# Patient Record
Sex: MALE | Race: WHITE | NOT HISPANIC OR LATINO
[De-identification: names, ages, dates, MRNs, and addresses within clinical notes are randomized per-mention and may not be internally consistent; named-entity substitution may affect disease eponyms.]

---

## 2017-02-16 ENCOUNTER — APPOINTMENT (OUTPATIENT)
Dept: POPULATION HEALTH | Facility: CLINIC | Age: 42
End: 2017-02-16

## 2017-02-16 VITALS
DIASTOLIC BLOOD PRESSURE: 78 MMHG | HEART RATE: 102 BPM | HEIGHT: 66 IN | BODY MASS INDEX: 40.66 KG/M2 | RESPIRATION RATE: 15 BRPM | SYSTOLIC BLOOD PRESSURE: 150 MMHG | WEIGHT: 253 LBS

## 2017-02-16 VITALS — OXYGEN SATURATION: 97 %

## 2017-03-27 ENCOUNTER — RX RENEWAL (OUTPATIENT)
Age: 42
End: 2017-03-27

## 2017-03-30 ENCOUNTER — RX RENEWAL (OUTPATIENT)
Age: 42
End: 2017-03-30

## 2017-06-29 ENCOUNTER — APPOINTMENT (OUTPATIENT)
Dept: POPULATION HEALTH | Facility: CLINIC | Age: 42
End: 2017-06-29
Payer: OTHER MISCELLANEOUS

## 2017-06-29 VITALS
DIASTOLIC BLOOD PRESSURE: 93 MMHG | RESPIRATION RATE: 17 BRPM | BODY MASS INDEX: 40.5 KG/M2 | TEMPERATURE: 98.6 F | SYSTOLIC BLOOD PRESSURE: 142 MMHG | WEIGHT: 252 LBS | HEART RATE: 105 BPM | OXYGEN SATURATION: 96 % | HEIGHT: 66 IN

## 2017-06-29 DIAGNOSIS — R10.33 PERIUMBILICAL PAIN: ICD-10-CM

## 2017-06-29 PROCEDURE — 99214 OFFICE O/P EST MOD 30 MIN: CPT

## 2017-08-24 ENCOUNTER — APPOINTMENT (OUTPATIENT)
Dept: POPULATION HEALTH | Facility: CLINIC | Age: 42
End: 2017-08-24

## 2019-03-04 ENCOUNTER — APPOINTMENT (OUTPATIENT)
Dept: POPULATION HEALTH | Facility: CLINIC | Age: 44
End: 2019-03-04
Payer: OTHER MISCELLANEOUS

## 2019-03-04 ENCOUNTER — TRANSCRIPTION ENCOUNTER (OUTPATIENT)
Age: 44
End: 2019-03-04

## 2019-03-04 VITALS
HEIGHT: 66 IN | RESPIRATION RATE: 17 BRPM | WEIGHT: 256 LBS | SYSTOLIC BLOOD PRESSURE: 132 MMHG | DIASTOLIC BLOOD PRESSURE: 91 MMHG | HEART RATE: 86 BPM | TEMPERATURE: 98.3 F | OXYGEN SATURATION: 95 % | BODY MASS INDEX: 41.14 KG/M2

## 2019-03-04 PROCEDURE — 94010 BREATHING CAPACITY TEST: CPT

## 2019-03-04 PROCEDURE — 99214 OFFICE O/P EST MOD 30 MIN: CPT | Mod: 25

## 2019-03-04 NOTE — HISTORY OF PRESENT ILLNESS
[FreeTextEntry1] : 44 yo soy with ZARA, GERD and chronic LBP here for routine f/u of  work-related chronic rhinosinusitis and  RADS both of which developed from work performed on the  [address]. He has been in [place] for an extended period tending to his ailing mother. While there his upper airway sxs persisted by this lower airway sxs improved. He reports ongoing use of his nasal sprays and though they confer some benefit they cause nasal dryness, occasional nose bleeds,etc. He reports a persistent dry cough over the past 18 m but o/w respiratory sxs have worsened since he return to the [facility] area but he needs to rx for his inhalers as the prior rx is where he was not adherent with his medications and his upper and lower resp sxs worsened.He continues to have asthma and sinus-like sxs triggered by exertion, cold air, fumes, dust, and other irritants. His sinus sxs--PND, congestion also persist but sxs are improved with the steroid spray, Astelin, and Zyrtec. His GERD is controlled by a PPI. Periumbilical pain associated with and worsened by his chronic cough and needs eval for potential hernia. Is seeking some kind of oral device as a means of addressing his ZARA.\par \par Is currently OOW since last spring due to L2-S1 herniations from WRI following fall and continues to have significant limitations due to his respiratory sxs.

## 2019-03-04 NOTE — PAST MEDICAL HISTORY
[FreeTextEntry1] : See intake note\par \par [place] x 2004\par  since 1995.\par Never had respiratory issues.

## 2019-03-04 NOTE — PHYSICAL EXAM
[General Appearance - Alert] : alert [General Appearance - In No Acute Distress] : in no acute distress [Sclera] : the sclera and conjunctiva were normal [PERRL With Normal Accommodation] : pupils were equal in size, round, and reactive to light [Extraocular Movements] : extraocular movements were intact [Outer Ear] : the ears and nose were normal in appearance [Oropharynx] : the oropharynx was normal [Neck Appearance] : the appearance of the neck was normal [Neck Cervical Mass (___cm)] : no neck mass was observed [Jugular Venous Distention Increased] : there was no jugular-venous distention [Thyroid Diffuse Enlargement] : the thyroid was not enlarged [Thyroid Nodule] : there were no palpable thyroid nodules [Auscultation Breath Sounds / Voice Sounds] : lungs were clear to auscultation bilaterally [Heart Rate And Rhythm] : heart rate was normal and rhythm regular [Heart Sounds] : normal S1 and S2 [Heart Sounds Gallop] : no gallops [Murmurs] : no murmurs [Heart Sounds Pericardial Friction Rub] : no pericardial rub [Cervical Lymph Nodes Enlarged Posterior Bilaterally] : posterior cervical [Cervical Lymph Nodes Enlarged Anterior Bilaterally] : anterior cervical [Supraclavicular Lymph Nodes Enlarged Bilaterally] : supraclavicular [Axillary Lymph Nodes Enlarged Bilaterally] : axillary [Femoral Lymph Nodes Enlarged Bilaterally] : femoral [Inguinal Lymph Nodes Enlarged Bilaterally] : inguinal [Skin Color & Pigmentation] : normal skin color and pigmentation [Skin Turgor] : normal skin turgor [] : no rash [Deep Tendon Reflexes (DTR)] : deep tendon reflexes were 2+ and symmetric [Sensation] : the sensory exam was normal to light touch and pinprick [No Focal Deficits] : no focal deficits [FreeTextEntry1] : scant clear d/c; swollen turbinates r>l ; oropharynx crowded with scant cobblestoning

## 2019-03-13 RX ORDER — FLUTICASONE PROPIONATE 50 UG/1
50 SPRAY, METERED NASAL TWICE DAILY
Qty: 1 | Refills: 3 | Status: DISCONTINUED | OUTPATIENT
Start: 2017-03-27 | End: 2019-03-13

## 2019-03-13 RX ORDER — CETIRIZINE HYDROCHLORIDE 10 MG/1
10 TABLET, FILM COATED ORAL DAILY
Qty: 30 | Refills: 3 | Status: DISCONTINUED | OUTPATIENT
Start: 2017-03-27 | End: 2019-03-13

## 2019-03-13 RX ORDER — ALBUTEROL SULFATE 90 UG/1
108 (90 BASE) AEROSOL, METERED RESPIRATORY (INHALATION)
Qty: 1 | Refills: 3 | Status: DISCONTINUED | OUTPATIENT
Start: 2017-03-30 | End: 2019-03-13

## 2019-03-13 RX ORDER — AZELASTINE HYDROCHLORIDE 205.5 UG/1
0.15 SPRAY, METERED NASAL
Qty: 1 | Refills: 3 | Status: DISCONTINUED | COMMUNITY
Start: 2017-03-30 | End: 2019-03-13

## 2019-03-14 ENCOUNTER — TRANSCRIPTION ENCOUNTER (OUTPATIENT)
Age: 44
End: 2019-03-14

## 2019-05-09 ENCOUNTER — APPOINTMENT (OUTPATIENT)
Dept: POPULATION HEALTH | Facility: CLINIC | Age: 44
End: 2019-05-09
Payer: OTHER MISCELLANEOUS

## 2019-05-09 VITALS
RESPIRATION RATE: 16 BRPM | SYSTOLIC BLOOD PRESSURE: 140 MMHG | BODY MASS INDEX: 50.65 KG/M2 | WEIGHT: 258 LBS | HEIGHT: 60 IN | TEMPERATURE: 98.1 F | DIASTOLIC BLOOD PRESSURE: 100 MMHG | OXYGEN SATURATION: 95 % | HEART RATE: 89 BPM

## 2019-05-09 DIAGNOSIS — Z28.21 IMMUNIZATION NOT CARRIED OUT BECAUSE OF PATIENT REFUSAL: ICD-10-CM

## 2019-05-09 PROCEDURE — 99214 OFFICE O/P EST MOD 30 MIN: CPT

## 2019-05-15 ENCOUNTER — APPOINTMENT (OUTPATIENT)
Dept: SURGERY | Facility: CLINIC | Age: 44
End: 2019-05-15

## 2019-05-17 ENCOUNTER — APPOINTMENT (OUTPATIENT)
Dept: SURGERY | Facility: CLINIC | Age: 44
End: 2019-05-17

## 2019-07-08 ENCOUNTER — APPOINTMENT (OUTPATIENT)
Dept: POPULATION HEALTH | Facility: CLINIC | Age: 44
End: 2019-07-08
Payer: OTHER MISCELLANEOUS

## 2019-07-08 VITALS
TEMPERATURE: 97.9 F | WEIGHT: 254 LBS | RESPIRATION RATE: 14 BRPM | DIASTOLIC BLOOD PRESSURE: 84 MMHG | BODY MASS INDEX: 40.82 KG/M2 | HEART RATE: 82 BPM | OXYGEN SATURATION: 97 % | HEIGHT: 66 IN | SYSTOLIC BLOOD PRESSURE: 145 MMHG

## 2019-07-08 PROCEDURE — 99214 OFFICE O/P EST MOD 30 MIN: CPT

## 2019-07-08 NOTE — HISTORY OF PRESENT ILLNESS
[FreeTextEntry1] : 42 yo olivier with ZARA, GERD and chronic LBP here for routine f/u of  work-related chronic rhinosinusitis and  RADS both of which developed from work performed on the  [address]. He has relocated from  [place] several months ago. Since being back, particularly with the recent wave of hot weather, his sx severity has necessitated ongoing use of his nasal sprays which improve the severity of his sxs. He continues to have asthma and sinus-like sxs triggered by exertion, both hot and cold weather, fumes, dust, and other irritants. Associated with his respiratory sxs is a persistent, dry cough which is now associated with pain in his R chest wall, axilla area. His sinus sxs--PND, congestion also persist but sxs are improved with the steroid spray, Astelin, and Zyrtec. ventral, periumbilical protrusion associated with and worsened by his chronic cough and needs eval for potential hernia. Is seeking some kind of oral device as a means of addressing his AZRA.\par \par Since the last visit he saw pulm who documented asthma as a diagnosis and sent him for MCT which is pending and chest CT was unchanged from prior. He also went to ENT who documented CRS but no CT of the sinuses was done.\par Vental hernia was evaluated by surgery who recommended wt loss and repair.l\par \par Continues to be OOW since last spring due to L2-S1 herniations from [place]RI following fall and continues to have significant limitations due to his respiratory sxs all of which are likely contributing to limitations of his physical activity, wt gain.

## 2019-07-08 NOTE — PHYSICAL EXAM
[General Appearance - Alert] : alert [Sclera] : the sclera and conjunctiva were normal [PERRL With Normal Accommodation] : pupils were equal in size, round, and reactive to light [General Appearance - In No Acute Distress] : in no acute distress [Outer Ear] : the ears and nose were normal in appearance [Extraocular Movements] : extraocular movements were intact [Oropharynx] : the oropharynx was normal [Neck Appearance] : the appearance of the neck was normal [Thyroid Diffuse Enlargement] : the thyroid was not enlarged [Neck Cervical Mass (___cm)] : no neck mass was observed [Jugular Venous Distention Increased] : there was no jugular-venous distention [Auscultation Breath Sounds / Voice Sounds] : lungs were clear to auscultation bilaterally [Thyroid Nodule] : there were no palpable thyroid nodules [Heart Rate And Rhythm] : heart rate was normal and rhythm regular [Heart Sounds Gallop] : no gallops [Heart Sounds] : normal S1 and S2 [Heart Sounds Pericardial Friction Rub] : no pericardial rub [Murmurs] : no murmurs [Cervical Lymph Nodes Enlarged Anterior Bilaterally] : anterior cervical [Cervical Lymph Nodes Enlarged Posterior Bilaterally] : posterior cervical [Inguinal Lymph Nodes Enlarged Bilaterally] : inguinal [Supraclavicular Lymph Nodes Enlarged Bilaterally] : supraclavicular [Femoral Lymph Nodes Enlarged Bilaterally] : femoral [Axillary Lymph Nodes Enlarged Bilaterally] : axillary [] : no rash [Skin Turgor] : normal skin turgor [Skin Color & Pigmentation] : normal skin color and pigmentation [Sensation] : the sensory exam was normal to light touch and pinprick [Deep Tendon Reflexes (DTR)] : deep tendon reflexes were 2+ and symmetric [No Focal Deficits] : no focal deficits [FreeTextEntry1] : TTP R axilla

## 2019-07-08 NOTE — ASSESSMENT
[FreeTextEntry1] : 44 yo soy with ZARA, GERD and chronic LBP here for f/u of  work-related chronic rhinosinusitis and work-related RADS as well as resultant  worsening of underlying ZARA, obesity, chronic cough, ventral hernia. His sxs persist despite being removed from the [address] [place] [address] worksite indicating his conditions have developed into chronic problems. He now reports worsening periumbical discomfort and protrusion associated with his persistent cough. Advised ibuprofen 400 mg, po tid with meals x 3 weeks to address rib pain associated with cough. MCT is pending, I will put in for C4 auth to seek CT sinuses.\par \par F/u with ENT, Pulm and surg as advised.\par \par Given that it has been almost 5 years since exposure, MMI may have been reached.\par \par Am advising him to have his ventral hernia evaluated surgically. Obesity is a risk factor for a ventral hernia but so is chronic coughing ,due to the resultant intraabdominal pressure. Both his obesity and his cough can be linked to his work-related injuries/illnesses. Likewise, the obesity and chronic sinus issues are causing his ZARA. \par \par \par His LBP and his respiratory (upper and lower) are all contributing to his being unable to work.\par Counseled on wt loss and exercise.\par \par I counseled him on appropriate use of his inhalers, and LSM. \par \par In regards to upper and lower airway conditions, Impairment 100% at [address] [place] [address] subway, 75% away from work.\par RTC 2 m\par Will fill out C4.2 and auth for f/u CT scan

## 2019-08-05 ENCOUNTER — TRANSCRIPTION ENCOUNTER (OUTPATIENT)
Age: 44
End: 2019-08-05

## 2019-08-06 ENCOUNTER — INPATIENT (INPATIENT)
Facility: HOSPITAL | Age: 44
LOS: 0 days | Discharge: HOME | DRG: 354 | End: 2019-08-07
Attending: SURGERY | Admitting: SURGERY
Payer: COMMERCIAL

## 2019-08-06 ENCOUNTER — RESULT REVIEW (OUTPATIENT)
Age: 44
End: 2019-08-06

## 2019-08-06 VITALS
HEART RATE: 88 BPM | SYSTOLIC BLOOD PRESSURE: 122 MMHG | TEMPERATURE: 98 F | RESPIRATION RATE: 22 BRPM | OXYGEN SATURATION: 98 % | DIASTOLIC BLOOD PRESSURE: 88 MMHG

## 2019-08-06 VITALS
SYSTOLIC BLOOD PRESSURE: 139 MMHG | OXYGEN SATURATION: 96 % | HEART RATE: 96 BPM | TEMPERATURE: 98 F | DIASTOLIC BLOOD PRESSURE: 86 MMHG | RESPIRATION RATE: 20 BRPM

## 2019-08-06 DIAGNOSIS — K42.0 UMBILICAL HERNIA WITH OBSTRUCTION, WITHOUT GANGRENE: ICD-10-CM

## 2019-08-06 DIAGNOSIS — M25.562 PAIN IN LEFT KNEE: Chronic | ICD-10-CM

## 2019-08-06 LAB
ANION GAP SERPL CALC-SCNC: 13 MMOL/L — SIGNIFICANT CHANGE UP (ref 5–17)
APTT BLD: 30.3 SEC — SIGNIFICANT CHANGE UP (ref 27.5–36.3)
BASOPHILS # BLD AUTO: 0.05 K/UL — SIGNIFICANT CHANGE UP (ref 0–0.2)
BASOPHILS NFR BLD AUTO: 0.8 % — SIGNIFICANT CHANGE UP (ref 0–2)
BLD GP AB SCN SERPL QL: SIGNIFICANT CHANGE UP
BUN SERPL-MCNC: 15 MG/DL — SIGNIFICANT CHANGE UP (ref 8–20)
CALCIUM SERPL-MCNC: 9.5 MG/DL — SIGNIFICANT CHANGE UP (ref 8.6–10.2)
CHLORIDE SERPL-SCNC: 102 MMOL/L — SIGNIFICANT CHANGE UP (ref 98–107)
CO2 SERPL-SCNC: 21 MMOL/L — LOW (ref 22–29)
CREAT SERPL-MCNC: 0.88 MG/DL — SIGNIFICANT CHANGE UP (ref 0.5–1.3)
EOSINOPHIL # BLD AUTO: 0.03 K/UL — SIGNIFICANT CHANGE UP (ref 0–0.5)
EOSINOPHIL NFR BLD AUTO: 0.5 % — SIGNIFICANT CHANGE UP (ref 0–6)
GLUCOSE SERPL-MCNC: 102 MG/DL — SIGNIFICANT CHANGE UP (ref 70–115)
HCT VFR BLD CALC: 49.7 % — SIGNIFICANT CHANGE UP (ref 39–50)
HGB BLD-MCNC: 16.7 G/DL — SIGNIFICANT CHANGE UP (ref 13–17)
IMM GRANULOCYTES NFR BLD AUTO: 0.5 % — SIGNIFICANT CHANGE UP (ref 0–1.5)
INR BLD: 1.08 RATIO — SIGNIFICANT CHANGE UP (ref 0.88–1.16)
LYMPHOCYTES # BLD AUTO: 1.47 K/UL — SIGNIFICANT CHANGE UP (ref 1–3.3)
LYMPHOCYTES # BLD AUTO: 23 % — SIGNIFICANT CHANGE UP (ref 13–44)
MCHC RBC-ENTMCNC: 27.4 PG — SIGNIFICANT CHANGE UP (ref 27–34)
MCHC RBC-ENTMCNC: 33.6 GM/DL — SIGNIFICANT CHANGE UP (ref 32–36)
MCV RBC AUTO: 81.6 FL — SIGNIFICANT CHANGE UP (ref 80–100)
MONOCYTES # BLD AUTO: 0.77 K/UL — SIGNIFICANT CHANGE UP (ref 0–0.9)
MONOCYTES NFR BLD AUTO: 12.1 % — SIGNIFICANT CHANGE UP (ref 2–14)
NEUTROPHILS # BLD AUTO: 4.04 K/UL — SIGNIFICANT CHANGE UP (ref 1.8–7.4)
NEUTROPHILS NFR BLD AUTO: 63.1 % — SIGNIFICANT CHANGE UP (ref 43–77)
PLATELET # BLD AUTO: 226 K/UL — SIGNIFICANT CHANGE UP (ref 150–400)
POTASSIUM SERPL-MCNC: 4.2 MMOL/L — SIGNIFICANT CHANGE UP (ref 3.5–5.3)
POTASSIUM SERPL-SCNC: 4.2 MMOL/L — SIGNIFICANT CHANGE UP (ref 3.5–5.3)
PROTHROM AB SERPL-ACNC: 12.4 SEC — SIGNIFICANT CHANGE UP (ref 10–12.9)
RBC # BLD: 6.09 M/UL — HIGH (ref 4.2–5.8)
RBC # FLD: 13 % — SIGNIFICANT CHANGE UP (ref 10.3–14.5)
SODIUM SERPL-SCNC: 136 MMOL/L — SIGNIFICANT CHANGE UP (ref 135–145)
WBC # BLD: 6.39 K/UL — SIGNIFICANT CHANGE UP (ref 3.8–10.5)
WBC # FLD AUTO: 6.39 K/UL — SIGNIFICANT CHANGE UP (ref 3.8–10.5)

## 2019-08-06 PROCEDURE — 86901 BLOOD TYPING SEROLOGIC RH(D): CPT

## 2019-08-06 PROCEDURE — 86900 BLOOD TYPING SEROLOGIC ABO: CPT

## 2019-08-06 PROCEDURE — 85027 COMPLETE CBC AUTOMATED: CPT

## 2019-08-06 PROCEDURE — 80048 BASIC METABOLIC PNL TOTAL CA: CPT

## 2019-08-06 PROCEDURE — 93005 ELECTROCARDIOGRAM TRACING: CPT

## 2019-08-06 PROCEDURE — 88302 TISSUE EXAM BY PATHOLOGIST: CPT | Mod: 26

## 2019-08-06 PROCEDURE — 85730 THROMBOPLASTIN TIME PARTIAL: CPT

## 2019-08-06 PROCEDURE — 71046 X-RAY EXAM CHEST 2 VIEWS: CPT

## 2019-08-06 PROCEDURE — 74176 CT ABD & PELVIS W/O CONTRAST: CPT | Mod: 26

## 2019-08-06 PROCEDURE — 93010 ELECTROCARDIOGRAM REPORT: CPT

## 2019-08-06 PROCEDURE — 88302 TISSUE EXAM BY PATHOLOGIST: CPT

## 2019-08-06 PROCEDURE — 86850 RBC ANTIBODY SCREEN: CPT

## 2019-08-06 PROCEDURE — 99285 EMERGENCY DEPT VISIT HI MDM: CPT

## 2019-08-06 PROCEDURE — 36415 COLL VENOUS BLD VENIPUNCTURE: CPT

## 2019-08-06 PROCEDURE — 71046 X-RAY EXAM CHEST 2 VIEWS: CPT | Mod: 26

## 2019-08-06 PROCEDURE — 85610 PROTHROMBIN TIME: CPT

## 2019-08-06 PROCEDURE — 74176 CT ABD & PELVIS W/O CONTRAST: CPT

## 2019-08-06 RX ORDER — BUDESONIDE AND FORMOTEROL FUMARATE DIHYDRATE 160; 4.5 UG/1; UG/1
2 AEROSOL RESPIRATORY (INHALATION)
Refills: 0 | Status: DISCONTINUED | OUTPATIENT
Start: 2019-08-06 | End: 2019-08-06

## 2019-08-06 RX ORDER — OXYCODONE HYDROCHLORIDE 5 MG/1
1 TABLET ORAL
Qty: 10 | Refills: 0
Start: 2019-08-06

## 2019-08-06 RX ORDER — SODIUM CHLORIDE 9 MG/ML
1000 INJECTION, SOLUTION INTRAVENOUS
Refills: 0 | Status: DISCONTINUED | OUTPATIENT
Start: 2019-08-06 | End: 2019-08-06

## 2019-08-06 RX ORDER — LEVOTHYROXINE SODIUM 125 MCG
25 TABLET ORAL DAILY
Refills: 0 | Status: DISCONTINUED | OUTPATIENT
Start: 2019-08-06 | End: 2019-08-06

## 2019-08-06 RX ORDER — AZELASTINE 137 UG/1
2 SPRAY, METERED NASAL
Qty: 0 | Refills: 0 | DISCHARGE

## 2019-08-06 RX ORDER — ONDANSETRON 8 MG/1
4 TABLET, FILM COATED ORAL ONCE
Refills: 0 | Status: COMPLETED | OUTPATIENT
Start: 2019-08-06 | End: 2019-08-06

## 2019-08-06 RX ORDER — LORATADINE 10 MG/1
10 TABLET ORAL DAILY
Refills: 0 | Status: DISCONTINUED | OUTPATIENT
Start: 2019-08-06 | End: 2019-08-06

## 2019-08-06 RX ORDER — ALBUTEROL 90 UG/1
2 AEROSOL, METERED ORAL
Qty: 0 | Refills: 0 | DISCHARGE

## 2019-08-06 RX ORDER — MELOXICAM 15 MG/1
1 TABLET ORAL
Qty: 0 | Refills: 0 | DISCHARGE

## 2019-08-06 RX ORDER — IPRATROPIUM/ALBUTEROL SULFATE 18-103MCG
3 AEROSOL WITH ADAPTER (GRAM) INHALATION EVERY 6 HOURS
Refills: 0 | Status: DISCONTINUED | OUTPATIENT
Start: 2019-08-06 | End: 2019-08-06

## 2019-08-06 RX ORDER — FENTANYL CITRATE 50 UG/ML
50 INJECTION INTRAVENOUS
Refills: 0 | Status: DISCONTINUED | OUTPATIENT
Start: 2019-08-06 | End: 2019-08-07

## 2019-08-06 RX ORDER — LEVOTHYROXINE SODIUM 125 MCG
1 TABLET ORAL
Qty: 0 | Refills: 0 | DISCHARGE

## 2019-08-06 RX ORDER — OXYCODONE AND ACETAMINOPHEN 5; 325 MG/1; MG/1
2 TABLET ORAL EVERY 6 HOURS
Refills: 0 | Status: DISCONTINUED | OUTPATIENT
Start: 2019-08-06 | End: 2019-08-06

## 2019-08-06 RX ORDER — DIPHENHYDRAMINE HCL 50 MG
25 CAPSULE ORAL ONCE
Refills: 0 | Status: DISCONTINUED | OUTPATIENT
Start: 2019-08-06 | End: 2019-08-06

## 2019-08-06 RX ORDER — SODIUM CHLORIDE 9 MG/ML
1000 INJECTION, SOLUTION INTRAVENOUS
Refills: 0 | Status: DISCONTINUED | OUTPATIENT
Start: 2019-08-06 | End: 2019-08-07

## 2019-08-06 RX ORDER — FEXOFENADINE HCL 30 MG
1 TABLET ORAL
Qty: 0 | Refills: 0 | DISCHARGE

## 2019-08-06 RX ORDER — FLUTICASONE PROPIONATE 220 MCG
1 AEROSOL WITH ADAPTER (GRAM) INHALATION
Qty: 0 | Refills: 0 | DISCHARGE

## 2019-08-06 RX ORDER — OXYCODONE AND ACETAMINOPHEN 5; 325 MG/1; MG/1
1 TABLET ORAL EVERY 6 HOURS
Refills: 0 | Status: DISCONTINUED | OUTPATIENT
Start: 2019-08-06 | End: 2019-08-06

## 2019-08-06 RX ADMIN — SODIUM CHLORIDE 100 MILLILITER(S): 9 INJECTION, SOLUTION INTRAVENOUS at 22:51

## 2019-08-06 RX ADMIN — FENTANYL CITRATE 50 MICROGRAM(S): 50 INJECTION INTRAVENOUS at 23:19

## 2019-08-06 RX ADMIN — SODIUM CHLORIDE 100 MILLILITER(S): 9 INJECTION, SOLUTION INTRAVENOUS at 16:19

## 2019-08-06 RX ADMIN — ONDANSETRON 4 MILLIGRAM(S): 8 TABLET, FILM COATED ORAL at 23:21

## 2019-08-06 RX ADMIN — FENTANYL CITRATE 50 MICROGRAM(S): 50 INJECTION INTRAVENOUS at 23:25

## 2019-08-06 NOTE — H&P ADULT - NSHPLABSRESULTS_GEN_ALL_CORE
I&O's Detail      LABS:                        16.7   6.39  )-----------( 226      ( 06 Aug 2019 16:16 )             49.7     08-06    136  |  102  |  15.0  ----------------------------<  102  4.2   |  21.0<L>  |  0.88    Ca    9.5      06 Aug 2019 16:16      PT/INR - ( 06 Aug 2019 16:16 )   PT: 12.4 sec;   INR: 1.08 ratio         PTT - ( 06 Aug 2019 16:16 )  PTT:30.3 sec

## 2019-08-06 NOTE — H&P ADULT - NSHPREVIEWOFSYSTEMS_GEN_ALL_CORE
ROS:  HEENT: Denies headache, blurry vision  RESPIRATORY: Denies cough  CARDIAC: Denies chest pain, racing heart  GASTROINTESTINAL: Denies, constipation, diarrhea  GENITOURINARY: Denies dysuria, hematuria  NEUROLOGIC: Denies headaches, dizziness  MUSCULOSKELETAL: Denies muscle weakness  VASCULAR: Denies claudication and cramping.  ENDOCRINOLOGY: Denies heat or cold intolerance  HEMATOLOGY: Denies easy bleeding or blood transfusion.  DERMATOLOGY: Denies changes in moles or pigmentation changes  PSYCHIATRY: Denies depression, agitation or anxiety ROS:  HEENT: Denies headache, blurry vision  RESPIRATORY: Denies cough  CARDIAC: Denies chest pain, racing heart  GASTROINTESTINAL: See above  GENITOURINARY: Denies dysuria, hematuria  NEUROLOGIC: Denies headaches, dizziness  MUSCULOSKELETAL: Denies muscle weakness  VASCULAR: Denies claudication and cramping.  ENDOCRINOLOGY: Denies heat or cold intolerance  HEMATOLOGY: Denies easy bleeding or blood transfusion.  DERMATOLOGY: Denies changes in moles or pigmentation changes  PSYCHIATRY: Denies depression, agitation or anxiety

## 2019-08-06 NOTE — H&P ADULT - NSHPSOCIALHISTORY_GEN_ALL_CORE
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

## 2019-08-06 NOTE — H&P ADULT - NSICDXPASTMEDICALHX_GEN_ALL_CORE_FT
PAST MEDICAL HISTORY:  Asthma in adult Related to occupational exposure,  hx    COPD, moderate Related to occupational exposure,  hx    Hypothyroidism     Obesity     Spinal stenosis with chronic back pain related to distant injury/fall

## 2019-08-06 NOTE — H&P ADULT - NSHPPHYSICALEXAM_GEN_ALL_CORE
Vital Signs Last 24 Hrs  T(C): 36.8 (06 Aug 2019 14:07), Max: 36.8 (06 Aug 2019 14:07)  T(F): 98.3 (06 Aug 2019 14:07), Max: 98.3 (06 Aug 2019 14:07)  HR: 96 (06 Aug 2019 14:07) (96 - 96)  BP: 139/86 (06 Aug 2019 14:07) (139/86 - 139/86)  BP(mean): --  RR: 20 (06 Aug 2019 14:07) (20 - 20)  SpO2: 96% (06 Aug 2019 14:07) (96% - 96%)    PE  Gen: NAD AAO3  Pulm: No wheezing, no dyspnea  CV: RRR  Abd: Soft, obese. 5cm umbilical bulge with [id number] patch of blanching erythema over skin and localized tenderness, which is irreducible. No generalized tenderness/rebound/guarding. no inguinal hernias bilaterally  Ext: WWP,  Vasc: Pulses 2+ x4 ext  Neuro: CN II-XII intact, nonfocal exam

## 2019-08-06 NOTE — ED PROVIDER NOTE - PMH
No pertinent past medical history <<----- Click to add NO pertinent Past Medical History Mild asthma, unspecified whether complicated, unspecified whether persistent

## 2019-08-06 NOTE — ED PROVIDER NOTE - CLINICAL SUMMARY MEDICAL DECISION MAKING FREE TEXT BOX
umbilical hernia, now incarcerated/. + pain. seen by Dr Finkelstein sent to ER  PE as documented.  plan labs iv fluids npo ct ua ekg cxr admit to Dr Holder

## 2019-08-06 NOTE — H&P ADULT - ASSESSMENT
A/P: 44 yo M with Asthma/COPD and obesity with fat-containing incarcerated umbilical hernia with signs of strangulation (increased pain irreducibility, and blanching erythema overlying skin changes)    -Preop labs  -EKG  -CXR  -TNS  -CT scan shows only fat containing hernia; no bowel despite GI symptoms which were possibly related to pain, no clinical bowel obstruction which is congruent w scan  -DVT ppx SCDS and Lovenox  -Pt going to OR with Dr. Underwood today; booked as emergent addon  -Regular bed while OR is pending  -Will avoid Levaquin/Fluoroquinolones due to allergy. Pt states penicillins have never been a problem for him  -Restarted home meds and converted albuterol MDI to Duonebs standing

## 2019-08-06 NOTE — ASU DISCHARGE PLAN (ADULT/PEDIATRIC) - CALL YOUR DOCTOR IF YOU HAVE ANY OF THE FOLLOWING:
Fever greater than (need to indicate Fahrenheit or Celsius)/Bleeding that does not stop/Swelling that gets worse/Wound/Surgical Site with redness, or foul smelling discharge or pus/Pain not relieved by Medications/Nausea and vomiting that does not stop

## 2019-08-06 NOTE — H&P ADULT - HISTORY OF PRESENT ILLNESS
42 yo M w chronic cough due to COPD/Asthma has 3 years of a reducible umbilical hernia. Last week became more painful and still reducible; had been following with Dr. Finkelstein for an elective repair.  However yesterday  evening he felt increased pain severe, with irreducibility and some skin changes (erythema, blanching), which did not improve with ibuprofen and ice packs.  Pt was directed to come in today by Dr. Finkelstein due to these concerning symptoms and signs.    PMH: Obesity, prediabetes, HTN controlled with diet and exercise, Asthma/COPD 2/2 occupational exposure () 42 yo M w chronic cough due to COPD/Asthma has 3 years of a reducible umbilical hernia. Last week became more painful and still reducible; had been following with Dr. Finkelstein for an elective repair.  However yesterday  evening he felt increased pain severe, with irreducibility and some skin changes (erythema, blanching), which did not improve with ibuprofen and ice packs.  Pt was directed to come in today by Dr. Finkelstein due to these concerning symptoms and signs.  Pt had episode of nausea and diarrhea yesterday which resolved. 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 Pt is asymptomatic at this time from lung standpoint    PMH: Obesity, hypothyroid, allergic rhinitis, prediabetes, HTN controlled with diet and exercise, Asthma/COPD 2/2 occupational exposure ()

## 2019-08-06 NOTE — ED ADULT NURSE NOTE - OBJECTIVE STATEMENT
43 yom presents to ed c/o umbillical hernia protruding outward causing increased discomfort. sent here for sx denies fever denies chills denies burning urination rick.

## 2019-08-06 NOTE — ED PROVIDER NOTE - CHPI ED SYMPTOMS NEG
no nausea/no fever/no diarrhea/no vomiting no vomiting/no fever/no chills/no burning urination/no diarrhea/no nausea/no blood in stool/no dysuria/no hematuria

## 2019-08-06 NOTE — ED PROVIDER NOTE - OBJECTIVE STATEMENT
42 y/o M pt with PMHx of asthma, COPD presents to the ED c/o abdominal pain. Pt is to be admitted for incarcerated umbilical hernia. Pt had a constant cough, that pushed the hernia out. Recently pt laid down on his stomach and the hernia went back in. Now the hernia is protruding and very painful. Pt will be admitted to Dr. Holder. Pt had a clear liquid 1.5 hours ago. This morning pt had loose stool. Normal urination. Denies n/v/d, fever.   Allergic to Levaquin. Pt takes Symbicort daily.

## 2019-08-12 LAB — SURGICAL PATHOLOGY STUDY: SIGNIFICANT CHANGE UP

## 2019-09-05 ENCOUNTER — RESULT REVIEW (OUTPATIENT)
Age: 44
End: 2019-09-05

## 2019-09-12 ENCOUNTER — RX RENEWAL (OUTPATIENT)
Age: 44
End: 2019-09-12

## 2019-10-17 ENCOUNTER — APPOINTMENT (OUTPATIENT)
Dept: POPULATION HEALTH | Facility: CLINIC | Age: 44
End: 2019-10-17
Payer: OTHER MISCELLANEOUS

## 2019-10-17 VITALS
OXYGEN SATURATION: 100 % | HEIGHT: 78 IN | SYSTOLIC BLOOD PRESSURE: 146 MMHG | WEIGHT: 256 LBS | DIASTOLIC BLOOD PRESSURE: 97 MMHG | BODY MASS INDEX: 29.62 KG/M2 | RESPIRATION RATE: 16 BRPM | HEART RATE: 90 BPM | TEMPERATURE: 97.4 F

## 2019-10-17 PROBLEM — E03.9 HYPOTHYROIDISM, UNSPECIFIED: Chronic | Status: ACTIVE | Noted: 2019-08-06

## 2019-10-17 PROBLEM — M48.00 SPINAL STENOSIS, SITE UNSPECIFIED: Chronic | Status: ACTIVE | Noted: 2019-08-06

## 2019-10-17 PROBLEM — J44.9 CHRONIC OBSTRUCTIVE PULMONARY DISEASE, UNSPECIFIED: Chronic | Status: ACTIVE | Noted: 2019-08-06

## 2019-10-17 PROBLEM — E66.9 OBESITY, UNSPECIFIED: Chronic | Status: ACTIVE | Noted: 2019-08-06

## 2019-10-17 PROBLEM — J45.909 UNSPECIFIED ASTHMA, UNCOMPLICATED: Chronic | Status: ACTIVE | Noted: 2019-08-06

## 2019-10-17 PROCEDURE — 99214 OFFICE O/P EST MOD 30 MIN: CPT

## 2019-10-17 RX ORDER — CETIRIZINE HYDROCHLORIDE 10 MG/1
10 TABLET, FILM COATED ORAL DAILY
Qty: 30 | Refills: 3 | Status: DISCONTINUED | COMMUNITY
Start: 2019-03-13 | End: 2019-10-17

## 2019-10-30 ENCOUNTER — APPOINTMENT (OUTPATIENT)
Dept: FAMILY MEDICINE | Facility: CLINIC | Age: 44
End: 2019-10-30
Payer: SELF-PAY

## 2019-10-30 VITALS — WEIGHT: 26 LBS | BODY MASS INDEX: 4.2 KG/M2 | TEMPERATURE: 97.7 F | OXYGEN SATURATION: 99 % | HEART RATE: 96 BPM

## 2019-10-30 VITALS — SYSTOLIC BLOOD PRESSURE: 135 MMHG | HEART RATE: 72 BPM | DIASTOLIC BLOOD PRESSURE: 85 MMHG

## 2019-10-30 VITALS — HEART RATE: 72 BPM | SYSTOLIC BLOOD PRESSURE: 130 MMHG | DIASTOLIC BLOOD PRESSURE: 85 MMHG

## 2019-10-30 DIAGNOSIS — Z02.4 ENCOUNTER FOR EXAMINATION FOR DRIVING LICENSE: ICD-10-CM

## 2019-10-30 PROCEDURE — 99499P DOT PHYSICAL: CUSTOM

## 2019-11-27 PROBLEM — Z28.21 INFLUENZA VACCINATION DECLINED: Status: RESOLVED | Noted: 2019-05-09 | Resolved: 2019-11-27

## 2020-01-13 ENCOUNTER — APPOINTMENT (OUTPATIENT)
Dept: POPULATION HEALTH | Facility: CLINIC | Age: 45
End: 2020-01-13
Payer: OTHER MISCELLANEOUS

## 2020-01-13 VITALS
DIASTOLIC BLOOD PRESSURE: 92 MMHG | TEMPERATURE: 98.1 F | OXYGEN SATURATION: 97 % | HEIGHT: 66 IN | SYSTOLIC BLOOD PRESSURE: 128 MMHG | HEART RATE: 91 BPM | WEIGHT: 258 LBS | RESPIRATION RATE: 15 BRPM | BODY MASS INDEX: 41.46 KG/M2

## 2020-01-13 PROCEDURE — 99214 OFFICE O/P EST MOD 30 MIN: CPT

## 2020-01-13 NOTE — PHYSICAL EXAM
[General Appearance - In No Acute Distress] : in no acute distress [General Appearance - Alert] : alert [Sclera] : the sclera and conjunctiva were normal [PERRL With Normal Accommodation] : pupils were equal in size, round, and reactive to light [Extraocular Movements] : extraocular movements were intact [Outer Ear] : the ears and nose were normal in appearance [Oropharynx] : the oropharynx was normal [Neck Appearance] : the appearance of the neck was normal [Neck Cervical Mass (___cm)] : no neck mass was observed [Thyroid Diffuse Enlargement] : the thyroid was not enlarged [Jugular Venous Distention Increased] : there was no jugular-venous distention [Thyroid Nodule] : there were no palpable thyroid nodules [Auscultation Breath Sounds / Voice Sounds] : lungs were clear to auscultation bilaterally [Heart Sounds] : normal S1 and S2 [Heart Rate And Rhythm] : heart rate was normal and rhythm regular [Heart Sounds Gallop] : no gallops [Murmurs] : no murmurs [Heart Sounds Pericardial Friction Rub] : no pericardial rub [Abdomen Tenderness] : non-tender [Supraclavicular Lymph Nodes Enlarged Bilaterally] : supraclavicular [Axillary Lymph Nodes Enlarged Bilaterally] : axillary [Cervical Lymph Nodes Enlarged Anterior Bilaterally] : anterior cervical [Cervical Lymph Nodes Enlarged Posterior Bilaterally] : posterior cervical [Femoral Lymph Nodes Enlarged Bilaterally] : femoral [Inguinal Lymph Nodes Enlarged Bilaterally] : inguinal [Skin Color & Pigmentation] : normal skin color and pigmentation [] : no rash [Skin Turgor] : normal skin turgor [Deep Tendon Reflexes (DTR)] : deep tendon reflexes were 2+ and symmetric [FreeTextEntry1] : TTP R axilla [Sensation] : the sensory exam was normal to light touch and pinprick [No Focal Deficits] : no focal deficits

## 2020-01-13 NOTE — ASSESSMENT
[FreeTextEntry1] : 43 yo olivier with ZARA, GERD and chronic LBP here for f/u of  work-related chronic rhinosinusitis and work-related RADS as well as resultant  worsening of underlying ZARA, obesity, chronic cough, ventral hernia (now treated). His sxs are clinically stable aka unchanged. His sinus manifestation are consistent with his workplace exposures and in his response to nasal spray medications and his CT sinus findings support this, as well. His respiratory sxs are consistent with his workplace exposures as well as exacerbated by this sinus problems, the mucus of which, drains into this lower airway causing inflammation. That his respiratory sxs improve with use of inhalers is c/w his having RADS. His ZARA, umbilical hernia and even wt gain have all been contributed to by his upper and lower airway issues.\par \par F/u with ENT, Pulm and surgeon as warranted. \par I counseled him on appropriate use of his inhalers, sprays,  and counseled extensively about LSM. \par \par In regards to upper and lower airway conditions, Impairment 100% at [address], 75% away from work.\par RTC 2 m\par Will fill out C4.2

## 2020-01-13 NOTE — HISTORY OF PRESENT ILLNESS
[FreeTextEntry1] : 45 yo olivier with ZARA, GERD and chronic LBP here for routine f/u of  work-related chronic rhinosinusitis and  RADS both of which developed from work performed on the  [address]. In the interval since our last visit he has been clinically stable and reports compliance with medications. He states that his CRS may be slightly worse but responds to meds. He continues to have asthma and sinus-like sxs triggered by exertion, both hot and cold weather, fumes, dust, and other irritants. Associated with his respiratory sxs is a persistent, dry cough. He had an MCT back in July 2019 which was negative. However, the role of MCT in RADS or IIA is not established. His sinus sxs--PND, congestion -- are c/w with the findings from his recent sinus CT. He indicates he is still in significant pain from his back.\par \par He had questions about LSM. Was recently cleared by ortho to resume exercising.

## 2020-06-25 ENCOUNTER — APPOINTMENT (OUTPATIENT)
Dept: POPULATION HEALTH | Facility: CLINIC | Age: 45
End: 2020-06-25
Payer: OTHER MISCELLANEOUS

## 2020-06-25 PROCEDURE — 99214 OFFICE O/P EST MOD 30 MIN: CPT | Mod: 95

## 2020-06-25 NOTE — END OF VISIT
[>50% of the face to face encounter time was spent on counseling and/or coordination of care for ___] : Greater than 50% of the face to face encounter time was spent on counseling and/or coordination of care for [unfilled] [>50% of Time Spent on Counseling for ____] : Greater than 50% of the encounter time was spent on counseling for [unfilled] [Time Spent: ___ minutes] : I have spent [unfilled] minutes of face to face time with the patient

## 2020-06-25 NOTE — ASSESSMENT
[FreeTextEntry1] : 43 yo olivier with ZARA, GERD and chronic LBP for telehealth visit regarding  work-related chronic rhinosinusitis and work-related RADS as well as resultant  worsening of underlying ZARA, obesity, chronic cough, ventral hernia (now treated). His upper and lower respiratory sxs are clinically stable aka unchanged. His sinus manifestation are consistent with his workplace exposures and in his response to nasal spray medications and his CT sinus findings support this, as well. His respiratory sxs are consistent with his workplace exposures as well as exacerbated by this sinus problems, the mucus of which, drains into this lower airway causing inflammation. That his respiratory sxs improve with use of inhalers is c/w his having RADS. 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 The umbilical hernia surgical site is painful and he is having that evaluated. \par \par F/u with ENT, Pulm and surgeon. \par I counseled him on appropriate use of his inhalers, sprays,  and counseled extensively about LSM. \par \par In regards to upper and lower airway conditions, Impairment 100% at [address], 75% away from work.\par RTC 2 m\par Will fill out C4.2 and C4 auth for CT chest.

## 2020-06-25 NOTE — HISTORY OF PRESENT ILLNESS
[Home] : at home, [unfilled] , at the time of the visit. [Verbal consent obtained from patient] : the patient, [unfilled] [FreeTextEntry1] : 43 yo olivier with ZARA, GERD and chronic LBP for telehealth visit of routine f/u of  work-related chronic rhinosinusitis and  RADS both of which developed from work performed on the  [address]. In the interval since our last visit he has been clinically stable and reports compliance with medications. He states that his CRS may be slightly worse but responds to meds however during winter and spring he experienced excessive nasal dryness associated with use of the nasal sprays and, subsequently, discomfort and nose-bleeds. These have improved with recent humid whether. He continues to have asthma and sinus-like sxs triggered by exertion, both hot and cold weather, fumes, dust, and other irritants. Associated with his respiratory sxs is a persistent, dry cough. . His sinus sxs--PND, congestion -- are c/w with the findings from his last year's sinus CT. He reports pain at the site of his abdominal surgery for which he is having an evaluation done.  He indicates he is still in significant pain from his back.\par \par

## 2020-08-17 ENCOUNTER — APPOINTMENT (OUTPATIENT)
Dept: FAMILY MEDICINE | Facility: CLINIC | Age: 45
End: 2020-08-17
Payer: OTHER MISCELLANEOUS

## 2020-08-17 VITALS — RESPIRATION RATE: 14 BRPM | TEMPERATURE: 97.8 F | HEART RATE: 89 BPM | OXYGEN SATURATION: 98 %

## 2020-08-17 VITALS — DIASTOLIC BLOOD PRESSURE: 90 MMHG | SYSTOLIC BLOOD PRESSURE: 130 MMHG

## 2020-08-17 PROCEDURE — 99213 OFFICE O/P EST LOW 20 MIN: CPT

## 2020-08-17 NOTE — ASSESSMENT
[FreeTextEntry1] : Visit narrative of dictation on this 44-year-old white male Roge Cazares who was beats renewal of his parking permit [employer] parking permit the patient came in his blood pressure is 130/90 feels well other than his chronic back pain and occasional attacks of asthma he is on 100% disability from not only his asthma but also for his back pain the patient is alert she is cooperative and fairly comfortable at the recommended he lose weight he's aware that but he eats the wrong type of foods that he must lose weight anyway he's on 100% permanent disability probably of neuromuscular origin and the distant disability forms parking permit was felt

## 2020-10-26 ENCOUNTER — TRANSCRIPTION ENCOUNTER (OUTPATIENT)
Age: 45
End: 2020-10-26

## 2020-10-29 ENCOUNTER — APPOINTMENT (OUTPATIENT)
Dept: POPULATION HEALTH | Facility: CLINIC | Age: 45
End: 2020-10-29
Payer: OTHER MISCELLANEOUS

## 2020-10-29 VITALS
WEIGHT: 254 LBS | RESPIRATION RATE: 15 BRPM | BODY MASS INDEX: 40.82 KG/M2 | HEIGHT: 66 IN | OXYGEN SATURATION: 98 % | DIASTOLIC BLOOD PRESSURE: 95 MMHG | TEMPERATURE: 97.5 F | SYSTOLIC BLOOD PRESSURE: 118 MMHG | HEART RATE: 92 BPM

## 2020-10-29 PROCEDURE — 99215 OFFICE O/P EST HI 40 MIN: CPT | Mod: 25

## 2020-10-29 PROCEDURE — 99072 ADDL SUPL MATRL&STAF TM PHE: CPT

## 2020-10-29 NOTE — HISTORY OF PRESENT ILLNESS
[Has the patient missed work because of the injury/illness?] : The patient has missed work because of the injury/illness. [No] : The patient is currently not working. [FreeTextEntry6] : 11/1/14 [FreeTextEntry1] : 46 yo olivier with ZARA, GERD and chronic LBP for telehealth visit of routine f/u of  work-related chronic rhinosinusitis and  RADS both of which developed from work performed on the  [address]. In the interval since our last visit he has been clinically stable and reports compliance with medications. He states that his CRS is unchanged and tends to worsen at night. Since weather turned cooler he experienced excessive nasal dryness associated with use of the nasal sprays and, subsequently, discomfort and nose-bleeds.  He continues to have asthma sxs and sinus-like sxs triggered by exertion, both hot and cold weather, fumes, dust, and other irritants. Associated with his respiratory sxs is a persistent, dry cough. . His sinus sxs--PND, congestion -- are c/w with the findings from his last year's sinus CT. He reports pain at the site of his abdominal surgery for which he is having an evaluation done.  He indicates he is still in significant pain from his back.\par \par Inquiring about his risk for COVID complications were he to become infected.\par \par

## 2020-10-29 NOTE — ASSESSMENT
[Indicate if, in your opinion, the incident that the patient described was the competent medical cause of this injury/illness.] : The incident that the patient described was the competent medical cause of this injury/illness: Yes [Indicate if the patient's complaints are consistent with his/her history of the injury/illness.] : Indicate if the patient's complaints are consistent with his/her history of the injury/illness: Yes [Yes] : Yes, it is consistent [Can the patient return to usual work activities as indicated? If yes, indicate date___] : The patient cannot return to usual work activities as indicated. [FreeTextEntry2] : RADS and CRS are causally linked to exposures tied to 11/01/14 injury. Mechanistically, the inhalational exposures are known to be causative for RADS and CRS. Furthermore, he had sufficient dose, frequency and intensity of exposure to result in RADS and CRS. [FreeTextEntry3] : RADS and CRS are causally linked to exposures tied to 11/01/14 injury. Mechanistically, the inhalational exposures are known to be causative for RADS and CRS. Furthermore, he had sufficient dose, frequency and intensity of exposure to result in RADS and CRS. [FreeTextEntry4] : RADS and CRS are causally linked to exposures tied to 11/01/14 injury. Mechanistically, the inhalational exposures are known to be causative for RADS and CRS. Furthermore, he had sufficient dose, frequency and intensity of exposure to result in RADS and CRS. [FreeTextEntry5] : 100 [FreeTextEntry6] : RADS and CRS are causally linked to exposures tied to 11/01/14 injury. Mechanistically, the inhalational exposures are known to be causative for RADS and CRS. Furthermore, he had sufficient dose, frequency and intensity of exposure to result in RADS and CRS.\par He has clinical symptoms c/w these diagnoses as well as objective findings on sinus imaging and PFTs. [FreeTextEntry7] : n/a [FreeTextEntry1] : 44 yo olivier with ZARA, GERD and chronic LBP for telehealth visit regarding  work-related chronic rhinosinusitis and work-related RADS as well as resultant  worsening of underlying ZARA, obesity, chronic cough, ventral hernia (now treated). His upper and lower respiratory sxs are clinically stable aka unchanged. His sinus manifestation are consistent with his workplace exposures and in his response to nasal spray medications and his CT sinus findings support this, as well. His respiratory sxs are consistent with his workplace exposures as well as exacerbated by this sinus problems, the mucus of which, drains into this lower airway causing inflammation. That his respiratory sxs improve with use of inhalers is c/w his having RADS. 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 The umbilical hernia surgical site is painful and he is having that evaluated. \par Discussed that given his dxs and BMI, he is at high risk for COVID complications. Advised he strictly adhere to distancing, hand hygiene and face covering.\par \par F/u with ENT, Pulm and surgeon. \par I counseled him on appropriate use of his inhalers, sprays,  and counseled extensively about LSM. \par \par In regards to upper and lower airway conditions, Impairment 100% at [address], 75% away from work.\par RTC 2 m\par Will fill out C4.2 and C4 auth for CT chest.

## 2020-10-29 NOTE — PHYSICAL EXAM
[General Appearance - Alert] : alert [General Appearance - In No Acute Distress] : in no acute distress [Sclera] : the sclera and conjunctiva were normal [PERRL With Normal Accommodation] : pupils were equal in size, round, and reactive to light [Extraocular Movements] : extraocular movements were intact [Outer Ear] : the ears and nose were normal in appearance [Oropharynx] : the oropharynx was normal [] : no respiratory distress [Auscultation Breath Sounds / Voice Sounds] : lungs were clear to auscultation bilaterally [Heart Rate And Rhythm] : heart rate was normal and rhythm regular [Heart Sounds] : normal S1 and S2 [Heart Sounds Gallop] : no gallops [Murmurs] : no murmurs [Heart Sounds Pericardial Friction Rub] : no pericardial rub [Full Pulse] : the pedal pulses are present [Edema] : there was no peripheral edema [Oriented To Time, Place, And Person] : oriented to person, place, and time [Impaired Insight] : insight and judgment were intact [Affect] : the affect was normal [Abdomen Tenderness] : non-tender [FreeTextEntry1] : TTP R axilla

## 2021-02-13 NOTE — REVIEW OF SYSTEMS
Dear Yannick,     Here are your recent results.  These results do not change our current plan of care.     If you have any questions, please contact the nurse coordinator according to your clinic location:     [facility]:  [place]: [phone]    [facility]  [place]: [phone]    [facility]:  [place]: [phone]      Omari Hughes MD    Pediatric Gastroenterology, Hepatology and Nutrition  [facility]               [see HPI] : see HPI [Negative] : Heme/Lymph

## 2021-02-28 ENCOUNTER — EMERGENCY (EMERGENCY)
Facility: HOSPITAL | Age: 46
LOS: 1 days | Discharge: HOME | End: 2021-02-28
Attending: EMERGENCY MEDICINE
Payer: MEDICAID

## 2021-02-28 VITALS
DIASTOLIC BLOOD PRESSURE: 79 MMHG | SYSTOLIC BLOOD PRESSURE: 175 MMHG | WEIGHT: 270.07 LBS | OXYGEN SATURATION: 94 % | RESPIRATION RATE: 22 BRPM | HEIGHT: 66 IN | HEART RATE: 104 BPM | TEMPERATURE: 97 F

## 2021-02-28 DIAGNOSIS — M25.562 PAIN IN LEFT KNEE: Chronic | ICD-10-CM

## 2021-02-28 PROCEDURE — 12002 RPR S/N/AX/GEN/TRNK2.6-7.5CM: CPT

## 2021-02-28 PROCEDURE — 99283 EMERGENCY DEPT VISIT LOW MDM: CPT | Mod: 25

## 2021-02-28 PROCEDURE — 73130 X-RAY EXAM OF HAND: CPT

## 2021-02-28 PROCEDURE — 73130 X-RAY EXAM OF HAND: CPT | Mod: 26,RT

## 2021-02-28 PROCEDURE — 90715 TDAP VACCINE 7 YRS/> IM: CPT

## 2021-02-28 PROCEDURE — 90471 IMMUNIZATION ADMIN: CPT

## 2021-02-28 RX ORDER — TETANUS TOXOID, REDUCED DIPHTHERIA TOXOID AND ACELLULAR PERTUSSIS VACCINE, ADSORBED 5; 2.5; 8; 8; 2.5 [IU]/.5ML; [IU]/.5ML; UG/.5ML; UG/.5ML; UG/.5ML
0.5 SUSPENSION INTRAMUSCULAR ONCE
Refills: 0 | Status: COMPLETED | OUTPATIENT
Start: 2021-02-28 | End: 2021-02-28

## 2021-02-28 RX ORDER — CEPHALEXIN 500 MG
1 CAPSULE ORAL
Qty: 30 | Refills: 0
Start: 2021-02-28 | End: 2021-03-09

## 2021-02-28 RX ORDER — CEPHALEXIN 500 MG
500 CAPSULE ORAL ONCE
Refills: 0 | Status: COMPLETED | OUTPATIENT
Start: 2021-02-28 | End: 2021-02-28

## 2021-02-28 RX ADMIN — TETANUS TOXOID, REDUCED DIPHTHERIA TOXOID AND ACELLULAR PERTUSSIS VACCINE, ADSORBED 0.5 MILLILITER(S): 5; 2.5; 8; 8; 2.5 SUSPENSION INTRAMUSCULAR at 14:08

## 2021-02-28 RX ADMIN — Medication 500 MILLIGRAM(S): at 15:08

## 2021-02-28 NOTE — ED PROVIDER NOTE - NSFOLLOWUPINSTRUCTIONS_ED_ALL_ED_FT

## 2021-02-28 NOTE — ED PROVIDER NOTE - PMH
Asthma in adult  Related to occupational exposure,  hx  COPD, moderate  Related to occupational exposure,  hx  Hypothyroidism    Obesity    Spinal stenosis  with chronic back pain related to distant injury/fall

## 2021-02-28 NOTE — ED PROVIDER NOTE - CLINICAL SUMMARY MEDICAL DECISION MAKING FREE TEXT BOX
46 yo M p/w lac to right hand from filet knife, went thru and thru producing 2 lacs yesterday. last tdap unknown  vss  right hand: radial pulse +2,   neurovasc intact- sensation intact, motor intact of all 5 fingers and wrist.  thenar eminence with 2 lacs- 2 cm to thenar eminence (entrance wound) and 0.5 cm to 1st webspace (exit)    low suspicion for tendon involvement, nerve involvement   will get xray, tdap, lac repair, reassess  kelfex rx

## 2021-02-28 NOTE — ED PROVIDER NOTE - CARE PROVIDER_API CALL
Ulices Landry (DO)  Orthopaedic Surgery  [address] [place] [address]  Phone: [phone]  Fax: [phone]  Follow Up Time:

## 2021-02-28 NOTE — ED PROVIDER NOTE - PHYSICAL EXAMINATION
Constitutional: Awake, Alert. NAD. Well appearing, well nourished. Cooperative. VS- slight tachycardia   HEAD: NC/AT; no signs of trauma   EYES: Conjunctiva and sclera are clear bilaterally.   NECK: Supple, non-tender. No nuchal rigidity. FROM. No midline or paraspinal TTP.  CARDIOVASCULAR: Normal S1, S2; + slight tachycardia. No audible murmurs. No chest wall ttp. Radial pulses +2 B/L.  RESPIRATORY: Speaking full sentences. Normal respiratory effort; breath sounds CTAB, No WRR. No accessory muscle use.   EXTREMITIES: Full passive and active ROM in all extremities; non-tender to palpation; distal pulses palpable and symmetric, no edema, no crepitus or step off.  SKIN: Warm, dry; good skin turgor, no apparent lesions or rashes, no ecchymosis, brisk capillary refill. Right thenar eminence with 2 lacs- 2 cm to thenar eminence (entrance wound) and 0.5 cm to 1st webspace (exit)  NEURO: AAOx3 follows commands. No facial droop. No truncal ataxia. Steady gait. Muscle strength 5/5 UE and LE B/L. Sensation intact B/L.

## 2021-02-28 NOTE — ED PROVIDER NOTE - OBJECTIVE STATEMENT
44 yo M p/w lac to right hand sustained yesterday after a filet knife fell to the floor, pt grabbed it, sustaining a thru and thru lac to right thenar eminence - 2 lacs. unsure of last tdap.   allergic to levaquin.  denies cp, sob, blood thinners, n/v/d/c/abd pain. c/o pain to hand and swelling.

## 2021-02-28 NOTE — ED PROVIDER NOTE - PATIENT PORTAL LINK FT
You can access the FollowMyHealth Patient Portal offered by [facility] by registering at the following website: [email or web address]. By joining [facility]’s FollowMyHealth portal, you will also be able to view your health information using other applications (apps) compatible with our system.

## 2021-03-08 NOTE — ED PROCEDURE NOTE - ATTENDING CONTRIBUTION TO CARE
I, the attending physician, was present with the PA/NP/resident for the key portions of the procedure.  Vear Etienne, DO

## 2021-03-08 NOTE — ED PROCEDURE NOTE - ATTENDING CONTRIBUTION TO CARE
I, the attending physician, was present with the PA/NP/resident for the key portions of the procedure.  Vera Etienne, DO

## 2021-09-08 ENCOUNTER — APPOINTMENT (OUTPATIENT)
Dept: FAMILY MEDICINE | Facility: CLINIC | Age: 46
End: 2021-09-08
Payer: MEDICAID

## 2021-09-08 VITALS — SYSTOLIC BLOOD PRESSURE: 135 MMHG | DIASTOLIC BLOOD PRESSURE: 82 MMHG | HEART RATE: 84 BPM

## 2021-09-08 VITALS
TEMPERATURE: 97.3 F | HEART RATE: 110 BPM | OXYGEN SATURATION: 97 % | WEIGHT: 275 LBS | BODY MASS INDEX: 44.2 KG/M2 | HEIGHT: 66 IN

## 2021-09-08 DIAGNOSIS — Z00.8 ENCOUNTER FOR OTHER GENERAL EXAMINATION: ICD-10-CM

## 2021-09-08 LAB
BILIRUB UR QL STRIP: NORMAL
GLUCOSE UR-MCNC: NORMAL
HCG UR QL: 0.2 EU/DL
HGB UR QL STRIP.AUTO: NORMAL
KETONES UR-MCNC: NORMAL
LEUKOCYTE ESTERASE UR QL STRIP: NORMAL
NITRITE UR QL STRIP: NORMAL
PH UR STRIP: 6.5
PROT UR STRIP-MCNC: NORMAL
SP GR UR STRIP: 1.02

## 2021-09-08 PROCEDURE — 81003 URINALYSIS AUTO W/O SCOPE: CPT | Mod: QW

## 2021-09-08 PROCEDURE — 99214 OFFICE O/P EST MOD 30 MIN: CPT | Mod: 25

## 2021-09-08 NOTE — HISTORY OF PRESENT ILLNESS
[No Pertinent Cardiac History] : no history of aortic stenosis, atrial fibrillation, coronary artery disease, recent myocardial infarction, or implantable device/pacemaker [Sleep Apnea] : sleep apnea [No Adverse Anesthesia Reaction] : no adverse anesthesia reaction in self or family member [Chronic Anticoagulation] : no chronic anticoagulation [Chronic Kidney Disease] : no chronic kidney disease [Diabetes] : no diabetes [FreeTextEntry1] : hernia [FreeTextEntry2] : 09/10/2021 [FreeTextEntry3] : DR. PARRA

## 2021-09-08 NOTE — PHYSICAL EXAM
[No Acute Distress] : no acute distress [Well Nourished] : well nourished [Well Developed] : well developed [Well-Appearing] : well-appearing [Normal Sclera/Conjunctiva] : normal sclera/conjunctiva [PERRL] : pupils equal round and reactive to light [EOMI] : extraocular movements intact [Normal Outer Ear/Nose] : the outer ears and nose were normal in appearance [Normal Oropharynx] : the oropharynx was normal [No JVD] : no jugular venous distention [No Lymphadenopathy] : no lymphadenopathy [Supple] : supple [Thyroid Normal, No Nodules] : the thyroid was normal and there were no nodules present [No Respiratory Distress] : no respiratory distress  [Clear to Auscultation] : lungs were clear to auscultation bilaterally [No Accessory Muscle Use] : no accessory muscle use [Normal Rate] : normal rate  [Regular Rhythm] : with a regular rhythm [Normal S1, S2] : normal S1 and S2 [No Murmur] : no murmur heard [No Carotid Bruits] : no carotid bruits [No Abdominal Bruit] : a ~M bruit was not heard ~T in the abdomen [No Varicosities] : no varicosities [Pedal Pulses Present] : the pedal pulses are present [No Edema] : there was no peripheral edema [No Palpable Aorta] : no palpable aorta [No Extremity Clubbing/Cyanosis] : no extremity clubbing/cyanosis [Soft] : abdomen soft [Non Tender] : non-tender [Non-distended] : non-distended [No Masses] : no abdominal mass palpated [No HSM] : no HSM [Normal Bowel Sounds] : normal bowel sounds [Normal Posterior Cervical Nodes] : no posterior cervical lymphadenopathy [Normal Anterior Cervical Nodes] : no anterior cervical lymphadenopathy [No CVA Tenderness] : no CVA  tenderness [No Spinal Tenderness] : no spinal tenderness [No Joint Swelling] : no joint swelling [Grossly Normal Strength/Tone] : grossly normal strength/tone [No Rash] : no rash [Coordination Grossly Intact] : coordination grossly intact [No Focal Deficits] : no focal deficits [Normal Gait] : normal gait [Deep Tendon Reflexes (DTR)] : deep tendon reflexes were 2+ and symmetric [Normal Affect] : the affect was normal [Normal Insight/Judgement] : insight and judgment were intact [de-identified] : umbilical [de-identified] : obese

## 2021-09-09 LAB
ALBUMIN SERPL ELPH-MCNC: 4.4 G/DL
ALP BLD-CCNC: 56 U/L
ALT SERPL-CCNC: 45 U/L
ANION GAP SERPL CALC-SCNC: 12 MMOL/L
AST SERPL-CCNC: 12 U/L
BASOPHILS # BLD AUTO: 0.05 K/UL
BASOPHILS NFR BLD AUTO: 1 %
BILIRUB SERPL-MCNC: 0.2 MG/DL
BUN SERPL-MCNC: 20 MG/DL
CALCIUM SERPL-MCNC: 8.7 MG/DL
CHLORIDE SERPL-SCNC: 106 MMOL/L
CHOLEST SERPL-MCNC: 145 MG/DL
CO2 SERPL-SCNC: 23 MMOL/L
CREAT SERPL-MCNC: 0.88 MG/DL
EOSINOPHIL # BLD AUTO: 0.04 K/UL
EOSINOPHIL NFR BLD AUTO: 0.8 %
ESTIMATED AVERAGE GLUCOSE: 126 MG/DL
GLUCOSE SERPL-MCNC: 120 MG/DL
HBA1C MFR BLD HPLC: 6 %
HCT VFR BLD CALC: 47.2 %
HDLC SERPL-MCNC: 36 MG/DL
HGB BLD-MCNC: 15.3 G/DL
IMM GRANULOCYTES NFR BLD AUTO: 0.6 %
LDLC SERPL CALC-MCNC: 83 MG/DL
LYMPHOCYTES # BLD AUTO: 1.6 K/UL
LYMPHOCYTES NFR BLD AUTO: 33.3 %
MAN DIFF?: NORMAL
MCHC RBC-ENTMCNC: 27.3 PG
MCHC RBC-ENTMCNC: 32.4 GM/DL
MCV RBC AUTO: 84.1 FL
MONOCYTES # BLD AUTO: 0.5 K/UL
MONOCYTES NFR BLD AUTO: 10.4 %
NEUTROPHILS # BLD AUTO: 2.58 K/UL
NEUTROPHILS NFR BLD AUTO: 53.9 %
NONHDLC SERPL-MCNC: 110 MG/DL
PLATELET # BLD AUTO: 199 K/UL
POTASSIUM SERPL-SCNC: 5 MMOL/L
PROT SERPL-MCNC: 6.8 G/DL
PSA SERPL-MCNC: 1.02 NG/ML
RBC # BLD: 5.61 M/UL
RBC # FLD: 13.2 %
SODIUM SERPL-SCNC: 140 MMOL/L
T4 FREE SERPL-MCNC: 0.8 NG/DL
TRIGL SERPL-MCNC: 131 MG/DL
TSH SERPL-ACNC: 4.24 UIU/ML
WBC # FLD AUTO: 4.8 K/UL

## 2021-09-15 ENCOUNTER — APPOINTMENT (OUTPATIENT)
Dept: FAMILY MEDICINE | Facility: CLINIC | Age: 46
End: 2021-09-15
Payer: MEDICAID

## 2021-09-15 VITALS — WEIGHT: 274 LBS | BODY MASS INDEX: 44.22 KG/M2 | HEART RATE: 102 BPM | OXYGEN SATURATION: 92 %

## 2021-09-15 VITALS — SYSTOLIC BLOOD PRESSURE: 130 MMHG | DIASTOLIC BLOOD PRESSURE: 86 MMHG | HEART RATE: 84 BPM

## 2021-09-15 VITALS — TEMPERATURE: 97.7 F

## 2021-09-15 PROCEDURE — 99396 PREV VISIT EST AGE 40-64: CPT

## 2021-09-20 ENCOUNTER — NON-APPOINTMENT (OUTPATIENT)
Age: 46
End: 2021-09-20

## 2021-09-21 ENCOUNTER — NON-APPOINTMENT (OUTPATIENT)
Age: 46
End: 2021-09-21

## 2021-09-21 ENCOUNTER — APPOINTMENT (OUTPATIENT)
Dept: ORTHOPEDIC SURGERY | Facility: CLINIC | Age: 46
End: 2021-09-21
Payer: MEDICAID

## 2021-09-21 ENCOUNTER — APPOINTMENT (OUTPATIENT)
Dept: FAMILY MEDICINE | Facility: CLINIC | Age: 46
End: 2021-09-21
Payer: MEDICAID

## 2021-09-21 VITALS — OXYGEN SATURATION: 98 % | HEART RATE: 93 BPM | TEMPERATURE: 97.1 F

## 2021-09-21 VITALS — DIASTOLIC BLOOD PRESSURE: 80 MMHG | HEART RATE: 82 BPM | SYSTOLIC BLOOD PRESSURE: 130 MMHG

## 2021-09-21 DIAGNOSIS — M25.512 PAIN IN LEFT SHOULDER: ICD-10-CM

## 2021-09-21 PROCEDURE — 99204 OFFICE O/P NEW MOD 45 MIN: CPT

## 2021-09-21 PROCEDURE — 99213 OFFICE O/P EST LOW 20 MIN: CPT

## 2021-09-21 PROCEDURE — 73030 X-RAY EXAM OF SHOULDER: CPT | Mod: LT

## 2021-09-21 NOTE — ADDENDUM
[FreeTextEntry1] : Documented by Yung Storey acting as a scribe for Dr. oFrbes on 09/21/2021. \par \par All medical record entries made by the Scribe were at my, Dr. Forbes's, direction and\par personally dictated by me on 09/21/2021. I have reviewed the chart and agree that the record\par accurately reflects my personal performance of the history, physical exam, procedure and imaging.

## 2021-09-21 NOTE — PHYSICAL EXAM
[de-identified] : Physical Exam:\par General: Well appearing, no acute distress\par Neurologic: A&Ox3, No focal deficits\par Head: NCAT without abrasions, lacerations, or ecchymosis to head, face, or scalp\par Eyes: No scleral icterus, no gross abnormalities\par Respiratory: Equal chest wall expansion bilaterally, no accessory muscle use\par Lymphatic: No lymphadenopathy palpated\par Skin: Warm and dry\par Psychiatric: Normal mood and affect\par \par Left Shoulder\par ·	Inspection/Palpation: no tenderness, swelling or deformities. TTP Trapezius\par ·	Range of Motion: no crepitus with ROM; Active FF 0 - 80; ER at side 0 - 10; IR to side pocket; Passive FF 0 - 120 w/ pain ; ER at side 0 - 10\par ·	Strength: forward elevation in scapular plane 4/5, internal rotation 4/5, external rotation 4/5, adduction 4/5 and abduction 4/5 \par ·	Stability: no joint instability on provocative testing\par ·	Tests: Cadena test positive, Neer sign positive, negative drop arm test secondary to pain, bear hug test negative, Napolean sign negative, cross arm adduction positive, lift off sign positive, hornblowers sign negative, speeds test negative, Yergason's test negative, no bicipital groove tenderness, Carrillo's Active Compression test negative \par \par Right Shoulder\par ·	Inspection/Palpation: no tenderness, swelling or deformities\par ·	Range of Motion: full and painless in all planes, no crepitus\par ·	Strength: forward elevation in scapular plane 5/5, internal rotation 5/5, external rotation 5/5, adduction 5/5 and abduction 5/5\par ·	Stability: no joint instability on provocative testing\par ·	Tests: Cadena test negative, Neer sign negative, negative drop arm test secondary to pain, bear hug test negative, Napolean sign negative, cross arm adduction negative, lift off sign positive, hornblowers sign negative, speeds test negative, Yergason's test negative, no bicipital groove tenderness, Carrillo's Active Compression test negative  [de-identified] : The following radiographs were ordered and read by me during this patients visit. I reviewed each radiograph in detail with the patient and discussed the findings as highlighted below. \par \par 4 views of the Left shoulder show mild to moderate GH joint space narrowing, Osteophyte formation noted, no high riding humeral head, no fracture, dislocation or bony lesions. \par \par Procedure: Xray of Left shoulder\par Dated: 9/15/2021\par \par Impression:\par \par Mild to moderate degenerative changes of the left shoulder most manifest at the glenohumeral joint with subchondral cyst formation in the glenoid fossa and narrowing of the glenohumeral joint space. \par

## 2021-09-21 NOTE — DISCUSSION/SUMMARY
[de-identified] : JOHN SOUSA is a 46 year male being seen for initial visit L shoulder pain. He report constant pain since 2017 with no specific CHRIS. He reports sharp pain and weakness with pushing, and infrequent subluxation and instability of shoulder. He localizes most of his pain over anterior lateral and posterior region of his shoulder. He denies numbness or tingling down to forearm. Patient has tried HEP, chiropractor, medical massage without relief. He was prescribed mobic by his PCP that provided mild to no relief. \par \par We had a thorough discussion regarding the nature of his pain, the pathophysiology, as well as all treatment options. I discussed the treatment of degenerative arthritis with the patient at length today. I described the spectrum of treatment from nonoperative modalities to total joint arthroplasty. Noninvasive and nonoperative treatment modalities include weight reduction, activity modification with low impact exercise, PRN use of acetaminophen or anti-inflammatory medication if tolerated, glucosamine/chondroitin supplements, and physical therapy. Further treatments can include corticosteroid injection and the use of hyaluronic acid injections. Definitive treatment can certainly include total joint arthroplasty also. The risks and benefits of each treatment options was discussed and all questions were answered.\par  \par  All the risks and benefits of a shoulder arthroscopy with arthroscopy CAM procedure, biceps tenodesis, extensive debridement, capsular release were discussed with the patient. Risks include, but are not limited to, infection, bleeding, dislocation, nerve injury, blood clots and other possible medical complications, which were discussed with the patient. Also the risks of possible readmission were discussed with the patient. Patient completely understands all risks and benefits. The need for postoperative DVT prophylaxis discussed with the patient as well. The patient was given no assurances that all the symptoms will be alleviated. Patient completely understands all this. He understands a total shoulder replacement might be necessary in near future. \par \par Considering the patient's current presentation of pain, physical exam, and radiographs an MRI is indicated at this time. A prescription for this was given to the patient. We will go over the MRI results with him upon obtaining the results in the office and advise him of further treatment options. Given patient occupation, he wishes to postpone surgery; however, wishes to give his final decision based on MRI reports. He agrees with the above plan and all questions were answered. \par

## 2021-09-21 NOTE — HISTORY OF PRESENT ILLNESS
[Stable] : stable [___ yrs] : [unfilled] year(s) ago [4] : a current pain level of 4/10 [Lifting] : worsened by lifting [5] : an average pain level of 5/10 [Ice] : relieved by ice [Rest] : relieved by rest [de-identified] : JOHN SOUSA is a 46 year male being seen for initial visit L shoulder pain. He report constant pain since 2017 with no specific CHRIS. He reports sharp pain and weakness with pushing, and infrequent subluxation and instability of shoulder. He localizes most of his pain over anterior lateral and posterior region of his shoulder. He denies numbness or tingling down to forearm. Patient has tried HEP, chiropractor, medical massage without relief. He was prescribed mobic by his PCP that provided mild to no relief.

## 2021-10-04 ENCOUNTER — APPOINTMENT (OUTPATIENT)
Dept: FAMILY MEDICINE | Facility: CLINIC | Age: 46
End: 2021-10-04
Payer: SELF-PAY

## 2021-10-04 VITALS
HEIGHT: 66 IN | BODY MASS INDEX: 44.2 KG/M2 | HEART RATE: 81 BPM | TEMPERATURE: 97.3 F | WEIGHT: 275 LBS | OXYGEN SATURATION: 98 %

## 2021-10-04 VITALS — SYSTOLIC BLOOD PRESSURE: 130 MMHG | HEART RATE: 78 BPM | DIASTOLIC BLOOD PRESSURE: 80 MMHG

## 2021-10-04 DIAGNOSIS — Z02.89 ENCOUNTER FOR OTHER ADMINISTRATIVE EXAMINATIONS: ICD-10-CM

## 2021-10-04 PROCEDURE — 36415 COLL VENOUS BLD VENIPUNCTURE: CPT

## 2021-10-04 PROCEDURE — 99214 OFFICE O/P EST MOD 30 MIN: CPT | Mod: 25

## 2021-10-21 ENCOUNTER — APPOINTMENT (OUTPATIENT)
Dept: ORTHOPEDIC SURGERY | Facility: CLINIC | Age: 46
End: 2021-10-21
Payer: MEDICAID

## 2021-10-21 DIAGNOSIS — M75.22 BICIPITAL TENDINITIS, LEFT SHOULDER: ICD-10-CM

## 2021-10-21 DIAGNOSIS — M19.012 PRIMARY OSTEOARTHRITIS, LEFT SHOULDER: ICD-10-CM

## 2021-10-21 PROCEDURE — 99214 OFFICE O/P EST MOD 30 MIN: CPT | Mod: 25

## 2021-10-21 PROCEDURE — 20610 DRAIN/INJ JOINT/BURSA W/O US: CPT | Mod: LT

## 2021-10-21 NOTE — PROCEDURE
[de-identified] : Injection: Left shoulder (biceps tendon sheath).\par Indication: Biceps tendinitis.\par \par A discussion was had with the patient regarding this procedure and all questions were answered. All risks, benefits and alternatives were discussed. These included but were not limited to bleeding, infection, and allergic reaction. Alcohol was used to clean the skin, and betadine was used to sterilize and prep the area in the anterior aspect of the shoulder. Ethyl chloride spray was then used as a topical anesthetic. A 22-gauge needle was used to inject 1cc of 1% Xylocaine, 1cc of 0.25% Bupivacaine and 1cc of 40mg/mL Triamcinolone Acetonide into the biceps tendon sheath. A sterile bandage was then applied. The patient tolerated the procedure well and there were no complications. \par \par Injection: left shoulder (Subacromial). \par Indication: RTC tendinitis \par \par A discussion was had with the patient regarding this procedure and all questions were answered. All risks, benefits and alternatives were discussed. These included but were not limited to bleeding, infection, and allergic reaction. Alcohol was used to clean the skin, and betadine was used to sterilize and prep the area in the posterior aspect of the left  shoulder. Ethyl chloride spray was then used as a topical anesthetic. A 22-gauge needle was used to inject 3cc 1% xylocaine, 2cc 0.25% bupivacaine and 1cc of 40mg/mL triamcinolone acetonide into the left subacromial space. A sterile bandage was then applied. The patient tolerated the procedure well and there were no complications.

## 2021-10-21 NOTE — ADDENDUM
[FreeTextEntry1] : Documented by Yung Storey acting as a scribe for Dr. Forbes on 10/21/2021. \par \par All medical record entries made by the Scribe were at my, Dr. Forbes's, direction and\par personally dictated by me on 10/21/2021. I have reviewed the chart and agree that the record\par accurately reflects my personal performance of the history, physical exam, procedure and imaging.

## 2021-10-21 NOTE — PHYSICAL EXAM
[de-identified] : Physical Exam:\par General: Well appearing, no acute distress\par Neurologic: A&Ox3, No focal deficits\par Head: NCAT without abrasions, lacerations, or ecchymosis to head, face, or scalp\par Eyes: No scleral icterus, no gross abnormalities\par Respiratory: Equal chest wall expansion bilaterally, no accessory muscle use\par Lymphatic: No lymphadenopathy palpated\par Skin: Warm and dry\par Psychiatric: Normal mood and affect\par \par Left Shoulder\par ·	Inspection/Palpation: no tenderness, swelling or deformities. TTP Trapezius\par ·	Range of Motion: no crepitus with ROM; Active FF 0 - 120; ER at side 0 - 15; IR to back pocket; Passive FF 0 - 140 w/ pain ; ER at side 0 - 20\par ·	Strength: forward elevation in scapular plane 4/5, internal rotation 4/5, external rotation 4/5, adduction 4/5 and abduction 4/5 \par ·	Stability: no joint instability on provocative testing\par ·	Tests: Cadena test positive, Neer sign positive, negative drop arm test secondary to pain, bear hug test negative, Napolean sign negative, cross arm adduction positive, lift off sign positive, hornblowers sign negative, speeds test negative, Yergason's test negative, no bicipital groove tenderness, Carrillo's Active Compression test negative \par \par Right Shoulder\par ·	Inspection/Palpation: no tenderness, swelling or deformities\par ·	Range of Motion: full and painless in all planes, no crepitus\par ·	Strength: forward elevation in scapular plane 5/5, internal rotation 5/5, external rotation 5/5, adduction 5/5 and abduction 5/5\par ·	Stability: no joint instability on provocative testing\par ·	Tests: Cadena test negative, Neer sign negative, negative drop arm test secondary to pain, bear hug test negative, Napolean sign negative, cross arm adduction negative, lift off sign positive, hornblowers sign negative, speeds test negative, Yergason's test negative, no bicipital groove tenderness, Carrillo's Active Compression test negative  [de-identified] : Procedure: MRI of L shoulder - STAND -UP\par Dated: 10/04/2021\par \par Impression:\par \par 1. AC joint arthrosis. No lateral sloping of the acromion. Inferior curvature. Narrowing of the supraspinatus outlet. No narrowing of the humeral-acromial interval. \par 2. Infraspinatus tendinopathy and fraying. Traction edema and spurring at the humeral head. No muscle atrophy or tear. Fatty infiltration at the myotendinous junction. \par 3. Supraspinatus tendinopathy and fraying with low grade ill-defined articular tears within the fraying at the insertion. Fatty infiltration at the myotendinous junction. No atrophy or tear. \par 4. Biceps is intact in the groove. Tendinopathy extends to the anchor. Tenosynovitis. \par \par

## 2021-10-21 NOTE — HISTORY OF PRESENT ILLNESS
[Stable] : stable [___ yrs] : [unfilled] year(s) ago [4] : a current pain level of 4/10 [5] : an average pain level of 5/10 [Lifting] : worsened by lifting [Ice] : relieved by ice [Rest] : relieved by rest [de-identified] : JOHN SOUSA is a 46 year male being seen for f/u visit L shoulder pain. He reports sleeping on Left side is still painful. However, this is relieved with stretching where after feeling a pop, he has total relief. He presents for L shoulder MRI review performed at [facility] on 10/04/2021. MRI reveals: \par \par 1. AC joint arthrosis. No lateral sloping of the acromion. Inferior curvature. Narrowing of the supraspinatus outlet. No narrowing of the humeral-acromial interval. \par 2. Infraspinatus tendinopathy and fraying. Traction edema and spurring at the humeral head. No muscle atrophy or tear. Fatty infiltration at the myotendinous junction. \par 3. Supraspinatus tendinopathy and fraying with low grade ill-defined articular tears within the fraying at the insertion. Fatty infiltration at the myotendinous junction. No atrophy or tear. \par 4. Biceps is intact in the groove. Tendinopathy extends to the anchor. Tenosynovitis. \par \par

## 2021-10-21 NOTE — DISCUSSION/SUMMARY
[de-identified] : JOHN SOUSA is a 46 year male being seen for f/u visit L shoulder pain. He reports sleeping on Left side is still painful. However, this is relieved with stretching where after feeling a pop, he has total relief. He presents for L shoulder MRI review performed at [facility] on 10/04/2021. \par \par We had a thorough discussion regarding the nature of his pain, the pathophysiology, as well as all treatment options. Based on his MRI findings, and clinical exam, I discussed operative and non-operative treatment modalities. I discussed non-operative treatment ranging from cortisone injection, gel injections, prp, physical therapy, home exercise therapy, anti-inflammatory. Operative treatment may include but not limited to CAM procedure, biceps tenodesis, extensive debridement. Patient at this time elected to continue non-operative care. Pt due to his acute pain elected for a corticosteroid injection at today's visit and tolerated the procedure well. He should take it easy for the next 2-3 days while icing the joint. Conservative measures of treatment include rest until asymptomatic, activity avoidance, NSAID's PRN, application to ice to the area 2-3x daily for 20 minutes, with gradual return to activities. A home exercise sheet was provided and discussed with patient to follow. Patient will follow up in 6-8 wks or on prn basis for repeat clinical assessment. All questions were answered and the patient verbalized understanding. The patient is in agreement with this treatment plan. \par \par \par

## 2022-07-18 ENCOUNTER — APPOINTMENT (OUTPATIENT)
Dept: FAMILY MEDICINE | Facility: CLINIC | Age: 47
End: 2022-07-18

## 2022-07-18 VITALS
HEART RATE: 77 BPM | WEIGHT: 273 LBS | HEIGHT: 66 IN | TEMPERATURE: 97.8 F | BODY MASS INDEX: 43.87 KG/M2 | OXYGEN SATURATION: 97 %

## 2022-07-18 VITALS — SYSTOLIC BLOOD PRESSURE: 135 MMHG | DIASTOLIC BLOOD PRESSURE: 90 MMHG

## 2022-07-18 PROCEDURE — 99214 OFFICE O/P EST MOD 30 MIN: CPT

## 2022-07-18 RX ORDER — METFORMIN ER 500 MG 500 MG/1
500 TABLET ORAL
Refills: 0 | Status: DISCONTINUED | COMMUNITY
End: 2022-07-18

## 2022-07-18 RX ORDER — CLONIDINE HYDROCHLORIDE 0.1 MG/1
0.1 TABLET ORAL
Refills: 0 | Status: DISCONTINUED | COMMUNITY
End: 2022-07-18

## 2022-07-18 RX ORDER — AZELASTINE HYDROCHLORIDE 205.5 UG/1
0.15 SPRAY, METERED NASAL
Qty: 1 | Refills: 3 | Status: ACTIVE | COMMUNITY
Start: 2019-03-13 | End: 1900-01-01

## 2022-07-18 RX ORDER — ALBUTEROL SULFATE 90 UG/1
108 (90 BASE) INHALANT RESPIRATORY (INHALATION)
Qty: 1 | Refills: 3 | Status: ACTIVE | COMMUNITY
Start: 2019-09-12 | End: 1900-01-01

## 2022-07-18 NOTE — ASSESSMENT
[FreeTextEntry1] : Is unknown of dictation for this 46-year-old male who is coming in for refill of prescriptions and a blood pressure check his pressure today was 135/90 his heart had a regular rhythm S1 and S2 and his lungs are clear the patient has GERD as well as arthritis and is to contact the shoulder and ankle pains he feels essentially well today there are renewed all of his prescriptions that he return to the office when he needs additional refills

## 2022-07-28 RX ORDER — FLUTICASONE PROPIONATE 50 UG/1
50 SPRAY, METERED NASAL
Qty: 1 | Refills: 6 | Status: ACTIVE | COMMUNITY
Start: 2019-03-13 | End: 1900-01-01

## 2023-11-15 NOTE — ED PROVIDER NOTE - ATTENDING CONTRIBUTION TO CARE
I performed the initial face to face bedside interview with this patient regarding history of present illness, review of symptoms and past medical, social and family history.  I completed an independent physical examination.  I was the initial provider who evaluated this patient.  The history, review of symptoms and examination was documented by the scribe in my presence and I attest to the accuracy of the documentation.  I have signed out the follow up of any pending tests (i.e. labs, radiological studies) to the ACP.  I have discussed the patient’s plan of care and disposition with the ACP. Compound Nevus With Mild Atypia Histology Text: Nests of melanocytes are found at the dermoepidermal junction and within the dermis, with mild atypia.

## 2023-12-14 ENCOUNTER — NON-APPOINTMENT (OUTPATIENT)
Age: 48
End: 2023-12-14

## 2023-12-21 ENCOUNTER — NON-APPOINTMENT (OUTPATIENT)
Age: 48
End: 2023-12-21

## 2023-12-21 ENCOUNTER — APPOINTMENT (OUTPATIENT)
Dept: INTERNAL MEDICINE | Facility: CLINIC | Age: 48
End: 2023-12-21
Payer: MEDICAID

## 2023-12-21 VITALS
BODY MASS INDEX: 45.48 KG/M2 | HEIGHT: 66 IN | OXYGEN SATURATION: 97 % | DIASTOLIC BLOOD PRESSURE: 88 MMHG | WEIGHT: 283 LBS | RESPIRATION RATE: 16 BRPM | SYSTOLIC BLOOD PRESSURE: 138 MMHG | HEART RATE: 89 BPM

## 2023-12-21 DIAGNOSIS — Z12.11 ENCOUNTER FOR SCREENING FOR MALIGNANT NEOPLASM OF COLON: ICD-10-CM

## 2023-12-21 DIAGNOSIS — G47.30 SLEEP APNEA, UNSPECIFIED: ICD-10-CM

## 2023-12-21 DIAGNOSIS — R12 HEARTBURN: ICD-10-CM

## 2023-12-21 DIAGNOSIS — Z00.00 ENCOUNTER FOR GENERAL ADULT MEDICAL EXAMINATION W/OUT ABNORMAL FINDINGS: ICD-10-CM

## 2023-12-21 DIAGNOSIS — R79.89 OTHER SPECIFIED ABNORMAL FINDINGS OF BLOOD CHEMISTRY: ICD-10-CM

## 2023-12-21 DIAGNOSIS — J32.9 CHRONIC SINUSITIS, UNSPECIFIED: ICD-10-CM

## 2023-12-21 DIAGNOSIS — K46.9 UNSPECIFIED ABDOMINAL HERNIA W/OUT OBSTRUCTION OR GANGRENE: ICD-10-CM

## 2023-12-21 DIAGNOSIS — M54.50 LOW BACK PAIN, UNSPECIFIED: ICD-10-CM

## 2023-12-21 DIAGNOSIS — E03.9 HYPOTHYROIDISM, UNSPECIFIED: ICD-10-CM

## 2023-12-21 DIAGNOSIS — J06.9 ACUTE UPPER RESPIRATORY INFECTION, UNSPECIFIED: ICD-10-CM

## 2023-12-21 PROCEDURE — 99203 OFFICE O/P NEW LOW 30 MIN: CPT | Mod: 25

## 2023-12-21 RX ORDER — MELOXICAM 15 MG/1
15 TABLET ORAL
Refills: 0 | Status: ACTIVE | COMMUNITY

## 2023-12-21 RX ORDER — LEVOTHYROXINE SODIUM 100 MCG
100 TABLET ORAL
Refills: 0 | Status: ACTIVE | COMMUNITY

## 2023-12-21 RX ORDER — TESTOSTERONE CYPIONATE 100 MG/ML
100 INJECTION, SOLUTION INTRAMUSCULAR
Refills: 0 | Status: ACTIVE | COMMUNITY

## 2023-12-21 RX ORDER — BENZONATATE 100 MG/1
100 CAPSULE ORAL 3 TIMES DAILY
Qty: 15 | Refills: 0 | Status: ACTIVE | COMMUNITY
Start: 2023-12-21 | End: 1900-01-01

## 2023-12-21 NOTE — HISTORY OF PRESENT ILLNESS
[FreeTextEntry1] : check up [de-identified] : 48-year-old male is here for checkup.  This is my first time seeing this patient.  Patient denies any fevers, chills, nausea, vomiting, diarrhea, constipation, chest pain, shortness of breath, weakness, numbness, tingling at this time.  States he has been sick for 14 days.  States he has been coughing, having some clear phlegm production. He states that he was started on prednisone 60 mg daily for 5 days which she completed. States he was started on benzonatate which he has 1 to 2 days worth of left. States he has been using lavage without much relief.  States that he is producing some clear phlegm at this point.  Patient reports that he has had 0 COVID vaccines in the past.  Alcohol: Endorses. Patient reports that they drink alcohol 4 times a week (2-3 beers at a time). Smoking: Denies. Patient reports that they have never smoked cigarettes.  Illicit Drugs: Denies. Patient reports that they do not use any recreational drugs.  Colonoscopy: Patient reports that they never had a colonoscopy.  States he is interested in colon cancer screening at this point. Diet: Patient reports that they are okay with diet  Exercise: Patient reports that they are not that good with exercise Living Situation: Alone.  Patient reports that they live alone. Employment Status: Employed.  Patient reports that they are employed at this point in [employer]. Education: Reports that the highest level of education is High School. Relationship Status: Single  Number of kids : 0  ADLs: Fully functional (bathing, dressing, toileting, transferring, walking, feeding).  Patient reports that they can perform ADLs IADLs: Fully functional and needs no help or supervision to perform IADLs (using the telephone, shopping, preparing meals, housekeeping, doing laundry, using transportation, managing medications and managing finances).  Patient reports that they can perform IADLs.

## 2023-12-21 NOTE — ASSESSMENT
[FreeTextEntry1] : We will do routine blood work in the form of CBC, CMP, A1c, lipid, TSH, B12 folate at this point. he may follow-up earlier if needed Pt was told to call with problems or concerns. The patient was told to seek immediate attention by calling 911 or going to the ER if his condition does not improve or gets acutely worse. Patient states he only takes 3 medications at this point. States he takes levothyroxine 100 mcg, testosterone injections, meloxicam 15 mg daily. States uses albuterol inhaler as needed. States he does not take any more medications than the above mentioned and does not want to take any other medications that he was provided in the past.

## 2023-12-22 ENCOUNTER — APPOINTMENT (OUTPATIENT)
Dept: FAMILY MEDICINE | Facility: CLINIC | Age: 48
End: 2023-12-22
Payer: MEDICAID

## 2023-12-22 VITALS
HEIGHT: 66 IN | DIASTOLIC BLOOD PRESSURE: 90 MMHG | WEIGHT: 279 LBS | OXYGEN SATURATION: 97 % | HEART RATE: 92 BPM | BODY MASS INDEX: 44.84 KG/M2 | TEMPERATURE: 96.1 F | SYSTOLIC BLOOD PRESSURE: 134 MMHG

## 2023-12-22 DIAGNOSIS — J40 BRONCHITIS, NOT SPECIFIED AS ACUTE OR CHRONIC: ICD-10-CM

## 2023-12-22 DIAGNOSIS — I10 ESSENTIAL (PRIMARY) HYPERTENSION: ICD-10-CM

## 2023-12-22 DIAGNOSIS — J45.909 UNSPECIFIED ASTHMA, UNCOMPLICATED: ICD-10-CM

## 2023-12-22 PROCEDURE — 99214 OFFICE O/P EST MOD 30 MIN: CPT

## 2023-12-22 RX ORDER — TRIAMTERENE AND HYDROCHLOROTHIAZIDE 37.5; 25 MG/1; MG/1
37.5-25 CAPSULE ORAL
Qty: 90 | Refills: 0 | Status: ACTIVE | COMMUNITY
Start: 2021-10-04 | End: 1900-01-01

## 2023-12-22 RX ORDER — MELOXICAM 15 MG/1
15 TABLET ORAL DAILY
Qty: 90 | Refills: 0 | Status: ACTIVE | COMMUNITY
Start: 2022-07-18 | End: 1900-01-01

## 2023-12-22 RX ORDER — AZITHROMYCIN 250 MG/1
250 TABLET, FILM COATED ORAL
Qty: 1 | Refills: 0 | Status: ACTIVE | COMMUNITY
Start: 2023-12-22 | End: 1900-01-01

## 2023-12-22 NOTE — REVIEW OF SYSTEMS
[Postnasal Drip] : postnasal drip [Nasal Discharge] : nasal discharge [Shortness Of Breath] : no shortness of breath [Wheezing] : no wheezing [Cough] : cough [Negative] : Cardiovascular

## 2023-12-22 NOTE — PHYSICAL EXAM
[No Acute Distress] : no acute distress [Normal TMs] : both tympanic membranes were normal [Normal] : normal rate, regular rhythm, normal S1 and S2 and no murmur heard [de-identified] : Boggy nasal turbinates, clear nasal discharge [de-identified] : Mildly decreased breath sounds but no wheezes

## 2023-12-22 NOTE — HISTORY OF PRESENT ILLNESS
[Cold Symptoms] : cold symptoms [Mild] : mild [___ Weeks ago] :  [unfilled] weeks ago [Constant] : constant [Congestion] : congestion [Cough] : cough [Sore Throat] : sore throat [Wheezing] : wheezing [Shortness Of Breath] : shortness of breath [Headache] : headache [Worsening] : worsening [Chills] : no chills [Anorexia] : no anorexia [Earache] : no earache [Fatigue] : not fatigue [Fever] : no fever [de-identified] : Nasal drip and had body aches.  [FreeTextEntry8] : 47 yo patient presents to office for a cold that started 2 weeks ago.  He went to urgent care and was given 5-day course of prednisone as well as Tessalon Perles.  He is also been using DayQuil and NyQuil and Mucinex which does help.  His body aches resolved but he continues with a cough that is worse in the mornings.  Unaware of any known sick contacts did not get the flu shot this year.  Provider yesterday who ordered a nasal swab as well as chest x-ray but he has not gone for those yet.

## 2023-12-22 NOTE — ASSESSMENT
[FreeTextEntry1] : URI/bronchitis-check flu/COVID/RSV swab today.  Given duration of symptoms, as well as underlying asthma, Rx for azithromycin sent for asthmatic bronchitis.  Advised to continue with symptomatic treatment including nasal saline irrigation and Flonase, honey and warm water, self proning.  He has an order from another doctor for chest x-ray, advised that if symptoms do not improve to go for the x-ray.  He was also advised to restart his daily maintenance inhaler Symbicort.  He states that he has not seen a primary in a while and many of his medications are  so he will return once his acute illness has improved.  Hospital/return precautions discussed  Hypertension-likely slightly elevated today due to acute illness.  Antihypertensive refilled  Asthma-restart Symbicort daily and can continue to use albuterol as needed until symptoms improve  He states that he follows with a specialist for TRT and is having blood work done and will bring salts when he returns for physical and to resume preventive health care

## 2023-12-26 LAB
INFLUENZA A RESULT: NOT DETECTED
INFLUENZA B RESULT: NOT DETECTED
RESP SYN VIRUS RESULT: NOT DETECTED
SARS-COV-2 RESULT: DETECTED

## 2023-12-26 RX ORDER — BENZONATATE 200 MG/1
200 CAPSULE ORAL
Qty: 21 | Refills: 0 | Status: ACTIVE | COMMUNITY
Start: 2023-12-15

## 2024-01-19 ENCOUNTER — RX RENEWAL (OUTPATIENT)
Age: 49
End: 2024-01-19

## 2024-01-19 RX ORDER — ALBUTEROL SULFATE 90 UG/1
108 (90 BASE) INHALANT RESPIRATORY (INHALATION)
Qty: 5 | Refills: 5 | Status: ACTIVE | COMMUNITY
Start: 2019-03-13 | End: 1900-01-01

## 2024-02-01 RX ORDER — BENZONATATE 200 MG/1
200 CAPSULE ORAL 3 TIMES DAILY
Qty: 21 | Refills: 0 | Status: ACTIVE | COMMUNITY
Start: 2024-02-01 | End: 1900-01-01

## 2024-02-01 RX ORDER — BENZONATATE 100 MG/1
100 CAPSULE ORAL 3 TIMES DAILY
Qty: 42 | Refills: 0 | Status: ACTIVE | COMMUNITY
Start: 2024-02-01 | End: 1900-01-01

## 2024-03-20 NOTE — ED PROCEDURE NOTE - CPROC ED TIME OUT STATEMENT1
“Patient's name, , procedure and correct site were confirmed during the [facility] Timeout.”
What Type Of Note Output Would You Prefer (Optional)?: Standard Output
Hpi Title: Evaluation of Skin Lesions
Additional History: 6 mo Tbse.\\nSpots legs\\nHas not used efudex recently \\n\\nSpf25 neck, ears, wears baseball caps
“Patient's name, , procedure and correct site were confirmed during the [facility] Timeout.”

## 2024-06-05 ENCOUNTER — RX RENEWAL (OUTPATIENT)
Age: 49
End: 2024-06-05

## 2024-06-05 RX ORDER — BUDESONIDE AND FORMOTEROL FUMARATE DIHYDRATE 160; 4.5 UG/1; UG/1
160-4.5 AEROSOL RESPIRATORY (INHALATION)
Qty: 3 | Refills: 3 | Status: ACTIVE | COMMUNITY
Start: 2019-03-13 | End: 1900-01-01

## 2024-06-06 NOTE — ASSESSMENT
[FreeTextEntry1] : 42 yo soy with ZARA, GERD and chronic LBP here for f/u of  work-related chronic rhinosinusitis and work-related RADS as well as worsening of underlying ZARA all resulting from work at the  [address] [place] [address]. His sxs persist despite being removed from the [address] [place] [address] worksite indicating his conditions have developed into chronic problems. He now reports periumbical pain associated with his persistent cough\par His CRS seem improved with  Astelin and Flonase and, as time passes, it is evident that the exposures he experienced at this [address] [place] [address] have resulted in chronic sinus problems which have additionally worsened his underlying ZARA .  His respiratory sxs had improved in [place] but were exacerbated upon return to [facility] area and has triggers of exertion and a variety of inhalational irritants including dust, exhaust, etc. Office spirometry today is wnl but I am concerned about his persistent dry cough that did not improve even when his RADS sxs did while in [place]. As a result, I am sending him for a CT scan of the chest to assess for any underlying lung dz. \par Meanwhile, I will renew his meds. \par Counseled on wt loss and exercise.\par \par I counseled him on appropriate use of his inhalers, and LSM. \par \par In regards to upper and lower airway conditions, Impairment 100% at [address] [place] [address], 75% away from work.\par RTC 2 m\par Will fill out C4.2 and auth for CT scan
For information on Fall & Injury Prevention, visit: [email or web address] OR  [email or web address] OR  https://www.cdc.gov/steadi/patient.html

## 2025-07-24 NOTE — ED ADULT TRIAGE NOTE - TEMPERATURE IN FAHRENHEIT (DEGREES F)
It was a pleasure to care for you today!   Your opinion matters!  Thank you for choosing the [facility].  You might receive a survey about your experience today to evaluate our clinic.  If you do receive one, please take a few minutes to complete it.    Have questions? Our preferred method for contact is a telephone call.  Please note that the number that shows up on caller ID will be [phone]. Telephone calls have a turn around time of 1 business day. Please be aware that messages left via the Patient Portal have a turn around of time of 3-5 business days.    Prescription Refills?  Please call your preferred pharmacy.    Appointment changes? If you need to cancel, change, or schedule an appointment, please call the clinic  at [phone]           97.4